# Patient Record
Sex: FEMALE | Race: WHITE | Employment: FULL TIME | URBAN - NONMETROPOLITAN AREA
[De-identification: names, ages, dates, MRNs, and addresses within clinical notes are randomized per-mention and may not be internally consistent; named-entity substitution may affect disease eponyms.]

---

## 2018-09-24 ENCOUNTER — HOSPITAL ENCOUNTER (EMERGENCY)
Age: 23
Discharge: HOME | End: 2018-09-24

## 2018-09-24 VITALS
WEIGHT: 126 LBS | HEART RATE: 76 BPM | DIASTOLIC BLOOD PRESSURE: 64 MMHG | RESPIRATION RATE: 16 BRPM | HEIGHT: 63 IN | SYSTOLIC BLOOD PRESSURE: 110 MMHG | TEMPERATURE: 97.9 F | OXYGEN SATURATION: 98 % | BODY MASS INDEX: 22.32 KG/M2

## 2018-09-24 DIAGNOSIS — R11.0 NAUSEA: ICD-10-CM

## 2018-09-24 DIAGNOSIS — R10.9 ABDOMINAL PAIN, UNSPECIFIED ABDOMINAL LOCATION: ICD-10-CM

## 2018-09-24 DIAGNOSIS — J02.9 ACUTE PHARYNGITIS, UNSPECIFIED ETIOLOGY: Primary | ICD-10-CM

## 2018-09-24 DIAGNOSIS — R19.7 DIARRHEA, UNSPECIFIED TYPE: ICD-10-CM

## 2018-09-24 LAB
BACTERIA: ABNORMAL /HPF
BILIRUBIN URINE: ABNORMAL MG/DL
BLOOD, URINE: NEGATIVE
CAST TYPE: ABNORMAL /HPF
CLARITY: CLEAR
COLOR: YELLOW
CRYSTAL TYPE: ABNORMAL /HPF
EPITHELIAL CELLS, UA: ABNORMAL /HPF
GLUCOSE, URINE: NEGATIVE MG/DL
ICTOTEST: NEGATIVE
KETONES, URINE: ABNORMAL MG/DL
LEUKOCYTE ESTERASE, URINE: ABNORMAL
MUCUS: ABNORMAL HPF
NITRITE URINE, QUANTITATIVE: NEGATIVE
PH, URINE: 7 (ref 5–8)
PREGNANCY, URINE: NEGATIVE
PROTEIN UA: ABNORMAL MG/DL
RBC URINE: ABNORMAL /HPF (ref 0–6)
SPECIFIC GRAVITY UA: 1.01 (ref 1–1.03)
SPECIFIC GRAVITY, URINE: 1.01 (ref 1–1.03)
UROBILINOGEN, URINE: 1 MG/DL (ref 0.2–1)
VOLUME, (UVOL): 12 ML (ref 10–12)
WBC UA: ABNORMAL /HPF (ref 0–5)

## 2018-09-24 PROCEDURE — 99282 EMERGENCY DEPT VISIT SF MDM: CPT

## 2018-09-24 PROCEDURE — 6360000002 HC RX W HCPCS: Performed by: PHYSICIAN ASSISTANT

## 2018-09-24 PROCEDURE — 81025 URINE PREGNANCY TEST: CPT

## 2018-09-24 PROCEDURE — 6370000000 HC RX 637 (ALT 250 FOR IP): Performed by: PHYSICIAN ASSISTANT

## 2018-09-24 PROCEDURE — 81001 URINALYSIS AUTO W/SCOPE: CPT

## 2018-09-24 RX ORDER — ONDANSETRON 4 MG/1
4 TABLET, FILM COATED ORAL EVERY 8 HOURS PRN
Qty: 10 TABLET | Refills: 0 | Status: SHIPPED | OUTPATIENT
Start: 2018-09-24 | End: 2019-09-11

## 2018-09-24 RX ORDER — ONDANSETRON 4 MG/1
4 TABLET, ORALLY DISINTEGRATING ORAL ONCE
Status: COMPLETED | OUTPATIENT
Start: 2018-09-24 | End: 2018-09-24

## 2018-09-24 RX ORDER — DICYCLOMINE HYDROCHLORIDE 10 MG/1
10 CAPSULE ORAL ONCE
Status: COMPLETED | OUTPATIENT
Start: 2018-09-24 | End: 2018-09-24

## 2018-09-24 RX ORDER — ACETAMINOPHEN 500 MG
1000 TABLET ORAL ONCE
Status: COMPLETED | OUTPATIENT
Start: 2018-09-24 | End: 2018-09-24

## 2018-09-24 RX ORDER — DICYCLOMINE HYDROCHLORIDE 10 MG/1
20 CAPSULE ORAL EVERY 6 HOURS PRN
Qty: 20 CAPSULE | Refills: 0 | Status: SHIPPED | OUTPATIENT
Start: 2018-09-24 | End: 2019-09-11

## 2018-09-24 RX ADMIN — DICYCLOMINE HYDROCHLORIDE 10 MG: 10 CAPSULE ORAL at 16:16

## 2018-09-24 RX ADMIN — ONDANSETRON 4 MG: 4 TABLET, ORALLY DISINTEGRATING ORAL at 16:16

## 2018-09-24 RX ADMIN — ACETAMINOPHEN 1000 MG: 500 TABLET, FILM COATED ORAL at 16:15

## 2018-09-24 ASSESSMENT — PAIN DESCRIPTION - LOCATION: LOCATION: ABDOMEN

## 2018-09-24 ASSESSMENT — PAIN DESCRIPTION - PAIN TYPE: TYPE: ACUTE PAIN

## 2018-09-24 ASSESSMENT — PAIN DESCRIPTION - FREQUENCY: FREQUENCY: CONTINUOUS

## 2018-09-24 ASSESSMENT — PAIN SCALES - GENERAL
PAINLEVEL_OUTOF10: 10
PAINLEVEL_OUTOF10: 10

## 2018-09-24 NOTE — ED PROVIDER NOTES
eMERGENCY dEPARTMENT eNCOUnter        [address] [facility]    Chief Complaint   Patient presents with    Pharyngitis     x 2-3 days    Diarrhea     since this morning       HPI    Familia Jensen is a 23 y.o. female who presents with Sore throat, diarrhea. Onset was 3 days ago. Began with diarrhea, is not sure if there is any blood in it because she did not look. Denies fevers. States she gets some lower abdominal cramping more on the right before she has diarrhea then resolves, 10/10 while occurring. Reports nausea but no vomiting. States that her throat is been sore and she is been having a frontal headache. She has a sick contact with similar symptoms. She denies any changes in diet. Not aware of any current pregnancies. REVIEW OF SYSTEMS    See HPI for further details. Review of systems otherwise negative. Constitutional:  no fever. HENT:  + headache, no earache, + nasal congestion, no sinus pressure, + sore throat  Respiratory:  no cough, no sob. Cardiovascular:  Denies chest pain. GI:  Denies vomiting.  + nausea, diarrhea. Musculoskeletal:  Denies edema, tenderness. Integument:  Denies rashes. PAST MEDICAL HISTORY    Past Medical History:   Diagnosis Date    ADHD (attention deficit hyperactivity disorder)     Mild preeclampsia delivered 1/10/2017       SURGICAL HISTORY    History reviewed. No pertinent surgical history.     CURRENT MEDICATIONS    Current Outpatient Rx   Medication Sig Dispense Refill    ondansetron (ZOFRAN) 4 MG tablet Take 1 tablet by mouth every 8 hours as needed for Nausea 10 tablet 0    dicyclomine (BENTYL) 10 MG capsule Take 2 capsules by mouth every 6 hours as needed (cramps) 20 capsule 0    ibuprofen (ADVIL;MOTRIN) 600 MG tablet Take 1 tablet by mouth every 6 hours as needed for Pain 120 tablet 3    acetaminophen (TYLENOL) 500 MG tablet Take 1 tablet by mouth every 6 hours as needed for Pain 120 tablet 3       ALLERGIES    No Known Allergies    FAMILY HISTORY

## 2019-09-11 ENCOUNTER — APPOINTMENT (OUTPATIENT)
Dept: GENERAL RADIOLOGY | Age: 24
End: 2019-09-11

## 2019-09-11 ENCOUNTER — HOSPITAL ENCOUNTER (EMERGENCY)
Age: 24
Discharge: HOME | End: 2019-09-11
Attending: EMERGENCY MEDICINE

## 2019-09-11 VITALS
BODY MASS INDEX: 22.5 KG/M2 | RESPIRATION RATE: 16 BRPM | HEIGHT: 63 IN | SYSTOLIC BLOOD PRESSURE: 118 MMHG | DIASTOLIC BLOOD PRESSURE: 74 MMHG | TEMPERATURE: 98.3 F | OXYGEN SATURATION: 98 % | HEART RATE: 92 BPM | WEIGHT: 127 LBS

## 2019-09-11 DIAGNOSIS — S80.01XA CONTUSION OF RIGHT KNEE, INITIAL ENCOUNTER: Primary | ICD-10-CM

## 2019-09-11 PROCEDURE — 73560 X-RAY EXAM OF KNEE 1 OR 2: CPT

## 2019-09-11 PROCEDURE — 6370000000 HC RX 637 (ALT 250 FOR IP): Performed by: EMERGENCY MEDICINE

## 2019-09-11 PROCEDURE — 99283 EMERGENCY DEPT VISIT LOW MDM: CPT

## 2019-09-11 RX ORDER — IBUPROFEN 600 MG/1
600 TABLET ORAL ONCE
Status: COMPLETED | OUTPATIENT
Start: 2019-09-11 | End: 2019-09-11

## 2019-09-11 RX ADMIN — IBUPROFEN 600 MG: 600 TABLET, FILM COATED ORAL at 21:04

## 2019-09-11 ASSESSMENT — PAIN DESCRIPTION - DESCRIPTORS: DESCRIPTORS: NUMBNESS

## 2019-09-11 ASSESSMENT — PAIN DESCRIPTION - LOCATION: LOCATION: KNEE

## 2019-09-11 ASSESSMENT — PAIN SCALES - GENERAL: PAINLEVEL_OUTOF10: 4

## 2019-09-11 ASSESSMENT — PAIN DESCRIPTION - ORIENTATION: ORIENTATION: RIGHT

## 2019-09-11 ASSESSMENT — PAIN DESCRIPTION - PAIN TYPE: TYPE: ACUTE PAIN

## 2019-09-12 NOTE — ED PROVIDER NOTES
organization: Not on file     Attends meetings of clubs or organizations: Not on file     Relationship status: Not on file    Intimate partner violence:     Fear of current or ex partner: Not on file     Emotionally abused: Not on file     Physically abused: Not on file     Forced sexual activity: Not on file   Other Topics Concern    Not on file   Social History Narrative    Not on file     No current facility-administered medications for this encounter. No current outpatient medications on file. No Known Allergies    Nursing Notes Reviewed    Physical Exam:  ED Triage Vitals [09/11/19 2039]   Enc Vitals Group      /74      Pulse 92      Resp 16      Temp 98.3 °F (36.8 °C)      Temp Source Oral      SpO2 98 %      Weight 127 lb (57.6 kg)      Height 5' 3\" (1.6 m)      Head Circumference       Peak Flow       Pain Score       Pain Loc       Pain Edu? Excl. in [address]? GENERAL APPEARANCE: Awake and alert. Cooperative. No acute distress. EXTREMITIES: RLE: No deformity. Tenderness to palpation over lateral and medial aspects of knee. No ligamentous instability of the knee. Full range of motion at knee without limitation. Pain with knee flexion. SKIN: Warm and dry. I have reviewed and interpreted all of the currently available lab results from this visit (if applicable):  No results found for this visit on 09/11/19. Radiographs (if obtained):  [] The following radiograph was interpreted by myself in the absence of a radiologist:  [x] Radiologist's Report Reviewed:    EKG (if obtained): (All EKG's are interpreted by myself in the absence of a cardiologist)    MDM:  Plan of care is discussed thoroughly with the patient and family if present. If performed, all imaging and lab work also discussed with patient. All relevant prior results and chart reviewed if available. Patient presents with direct trauma to lateral knee.   She is neurovascularly intact without any significant

## 2019-09-12 NOTE — ED NOTES
Discharge instructions reviewed; patient verbalized understanding; patient transferred from ED via private vehicle.       Radha Marie RN  09/11/19 [id number]

## 2019-09-20 ENCOUNTER — HOSPITAL ENCOUNTER (EMERGENCY)
Age: 24
Discharge: HOME | End: 2019-09-20
Attending: EMERGENCY MEDICINE

## 2019-09-20 VITALS
BODY MASS INDEX: 22.5 KG/M2 | OXYGEN SATURATION: 99 % | HEART RATE: 75 BPM | SYSTOLIC BLOOD PRESSURE: 114 MMHG | DIASTOLIC BLOOD PRESSURE: 77 MMHG | RESPIRATION RATE: 14 BRPM | WEIGHT: 127 LBS | TEMPERATURE: 98.7 F | HEIGHT: 63 IN

## 2019-09-20 DIAGNOSIS — B34.9 VIRAL SYNDROME: Primary | ICD-10-CM

## 2019-09-20 PROCEDURE — 87430 STREP A AG IA: CPT

## 2019-09-20 PROCEDURE — 99283 EMERGENCY DEPT VISIT LOW MDM: CPT

## 2019-09-20 PROCEDURE — 87081 CULTURE SCREEN ONLY: CPT

## 2019-09-20 RX ORDER — GUAIFENESIN AND DEXTROMETHORPHAN HYDROBROMIDE 600; 30 MG/1; MG/1
1 TABLET, EXTENDED RELEASE ORAL 2 TIMES DAILY
Qty: 28 TABLET | Refills: 0 | Status: SHIPPED | OUTPATIENT
Start: 2019-09-20 | End: 2020-02-07

## 2019-09-20 RX ORDER — ONDANSETRON 4 MG/1
4 TABLET, FILM COATED ORAL 3 TIMES DAILY PRN
Qty: 30 TABLET | Refills: 0 | Status: SHIPPED | OUTPATIENT
Start: 2019-09-20 | End: 2020-02-07

## 2019-09-20 RX ORDER — NAPROXEN 500 MG/1
500 TABLET ORAL 2 TIMES DAILY
Qty: 60 TABLET | Refills: 0 | Status: SHIPPED | OUTPATIENT
Start: 2019-09-20 | End: 2020-02-07

## 2019-09-20 RX ORDER — ACETAMINOPHEN 325 MG/1
650 TABLET ORAL EVERY 6 HOURS PRN
Qty: 120 TABLET | Refills: 3 | Status: SHIPPED | OUTPATIENT
Start: 2019-09-20 | End: 2020-02-07

## 2019-09-20 ASSESSMENT — PAIN DESCRIPTION - LOCATION
LOCATION_2: HEAD
LOCATION: THROAT

## 2019-09-20 ASSESSMENT — PAIN DESCRIPTION - PAIN TYPE: TYPE: ACUTE PAIN

## 2019-09-20 ASSESSMENT — PAIN SCALES - GENERAL: PAINLEVEL_OUTOF10: 3

## 2019-09-20 ASSESSMENT — PAIN DESCRIPTION - FREQUENCY: FREQUENCY: CONTINUOUS

## 2019-09-20 ASSESSMENT — PAIN DESCRIPTION - DESCRIPTORS
DESCRIPTORS_2: STABBING
DESCRIPTORS: SHARP

## 2019-09-20 ASSESSMENT — PAIN DESCRIPTION - INTENSITY: RATING_2: 4

## 2019-09-20 ASSESSMENT — PAIN DESCRIPTION - DURATION: DURATION_2: CONTINUOUS

## 2019-09-20 NOTE — LETTER
[facility] Emergency Department  [address] [place] [id number]  Phone: [phone]  Fax: [phone]             September 20, 2019    Patient: Raúl Elise   YOB: 1995   Date of Visit: 9/20/2019       To Whom It May Concern:    Melany Walter was seen and treated in our emergency department on 9/20/2019. She may return to work on 09/21/2019.       Sincerely,             Signature:__________________________________

## 2019-09-20 NOTE — ED PROVIDER NOTES
Triage Chief Complaint:   Pharyngitis (States has been having hot flashes and cold sweats. States also coughing up greenish colored mucous. Also c/o sore throat. States has been having a little trouble breathing and headaches. States has been feeling nauseated. )    [place]:  Markos Steele is a 26 y.o. female that presents with sore throat, cough, fevers and chills, headache, nausea and vomiting and body aches. Patient was in baseline state of health until several days ago when the above started. Patient reports persistent productive cough and sore throat. Patient reports \"I am a carrier for strep and I think I might have strep throat\". Some associated nausea and vomiting that is nonbloody. Generalized malaise and body aches. No neck stiffness. No rash. No known sick contacts. Patient reports that she does get sick like this a lot. Patient is a smoker. No known COPD or asthma. ROS:  General:  + fevers, + chills  ENT: + sore throat, no runny nose  Cardiovascular:  No chest pain, no palpitations  Respiratory:  + shortness of breath with coughing fits, + cough  Gastrointestinal:  No pain, + nausea, + vomiting, no diarrhea  : No pain with urinating, no increased frequency urinating  Neurologic:  No numbness, no weakness  Extremities:  No edema, no pain  Skin:  No rash  Psych: No axienty    Past Medical History:   Diagnosis Date    ADHD (attention deficit hyperactivity disorder)     Mild preeclampsia delivered 1/10/2017     History reviewed. No pertinent surgical history. History reviewed. No pertinent family history.   Social History     Socioeconomic History    Marital status: Single     Spouse name: Not on file    Number of children: Not on file    Years of education: Not on file    Highest education level: Not on file   Occupational History    Not on file   Social Needs    Financial resource strain: Not on file    Food insecurity:     Worry: Not on file     Inability: Not on file   [facility] Transportation needs:     Medical: Not on file     Non-medical: Not on file   Tobacco Use    Smoking status: Current Every Day Smoker     Packs/day: 0.50     Types: Cigarettes    Smokeless tobacco: Former User   Substance and Sexual Activity    Alcohol use: No    Drug use: Yes     Types: Marijuana    Sexual activity: Not on file   Lifestyle    Physical activity:     Days per week: Not on file     Minutes per session: Not on file    Stress: Not on file   Relationships    Social connections:     Talks on phone: Not on file     Gets together: Not on file     Attends [employer] service: Not on file     Active member of club or organization: Not on file     Attends meetings of clubs or organizations: Not on file     Relationship status: Not on file    Intimate partner violence:     Fear of current or ex partner: Not on file     Emotionally abused: Not on file     Physically abused: Not on file     Forced sexual activity: Not on file   Other Topics Concern    Not on file   Social History Narrative    Not on file     No current facility-administered medications for this encounter.       Current Outpatient Medications   Medication Sig Dispense Refill    Dextromethorphan-guaiFENesin (MUCINEX DM)  MG TB12 Take 1 tablet by mouth 2 times daily 28 tablet 0    acetaminophen (AMINOFEN) 325 MG tablet Take 2 tablets by mouth every 6 hours as needed for Pain 120 tablet 3    naproxen (NAPROSYN) 500 MG tablet Take 1 tablet by mouth 2 times daily 60 tablet 0    ondansetron (ZOFRAN) 4 MG tablet Take 1 tablet by mouth 3 times daily as needed for Nausea or Vomiting 30 tablet 0     No Known Allergies    Nursing Notes Reviewed    Physical Exam:  ED Triage Vitals [09/20/19 1656]   Enc Vitals Group      /77      Pulse 75      Resp 14      Temp 98.7 °F (37.1 °C)      Temp Source Oral      SpO2 99 %      Weight 127 lb (57.6 kg)      Height 5' 3\" (1.6 m)      Head Circumference       Peak Flow       Pain Score       Pain well as words and phrases that may be inappropriate. If there are any questions or concerns please feel free to contact the dictating provider for clarification.          Larry Zhang MD  09/20/19 2448

## 2019-09-23 LAB
CULTURE: ABNORMAL
Lab: ABNORMAL
SPECIMEN: ABNORMAL
STREP A DIRECT SCREEN: NEGATIVE

## 2020-02-07 ENCOUNTER — HOSPITAL ENCOUNTER (EMERGENCY)
Age: 25
Discharge: HOME | End: 2020-02-07
Attending: EMERGENCY MEDICINE

## 2020-02-07 VITALS
SYSTOLIC BLOOD PRESSURE: 113 MMHG | OXYGEN SATURATION: 98 % | WEIGHT: 125 LBS | HEART RATE: 67 BPM | BODY MASS INDEX: 22.15 KG/M2 | RESPIRATION RATE: 16 BRPM | DIASTOLIC BLOOD PRESSURE: 73 MMHG | TEMPERATURE: 97.7 F | HEIGHT: 63 IN

## 2020-02-07 PROCEDURE — 99282 EMERGENCY DEPT VISIT SF MDM: CPT

## 2020-02-07 PROCEDURE — 6370000000 HC RX 637 (ALT 250 FOR IP): Performed by: EMERGENCY MEDICINE

## 2020-02-07 RX ORDER — DIPHENHYDRAMINE HCL 25 MG
50 CAPSULE ORAL ONCE
Status: COMPLETED | OUTPATIENT
Start: 2020-02-07 | End: 2020-02-07

## 2020-02-07 RX ORDER — CETIRIZINE HYDROCHLORIDE 10 MG/1
10 TABLET ORAL DAILY
Qty: 20 TABLET | Refills: 0 | Status: SHIPPED | OUTPATIENT
Start: 2020-02-07 | End: 2021-07-22

## 2020-02-07 RX ORDER — AMOXICILLIN 500 MG/1
500 CAPSULE ORAL ONCE
Status: COMPLETED | OUTPATIENT
Start: 2020-02-07 | End: 2020-02-07

## 2020-02-07 RX ORDER — AMOXICILLIN 500 MG/1
500 CAPSULE ORAL 2 TIMES DAILY
Qty: 20 CAPSULE | Refills: 0 | Status: SHIPPED | OUTPATIENT
Start: 2020-02-07 | End: 2020-02-17

## 2020-02-07 RX ADMIN — AMOXICILLIN 500 MG: 500 CAPSULE ORAL at 20:22

## 2020-02-07 RX ADMIN — DIPHENHYDRAMINE HYDROCHLORIDE 50 MG: 25 CAPSULE ORAL at 20:24

## 2020-02-07 ASSESSMENT — PAIN DESCRIPTION - DESCRIPTORS: DESCRIPTORS: ACHING

## 2020-02-07 ASSESSMENT — PAIN - FUNCTIONAL ASSESSMENT: PAIN_FUNCTIONAL_ASSESSMENT: PREVENTS OR INTERFERES SOME ACTIVE ACTIVITIES AND ADLS

## 2020-02-07 ASSESSMENT — PAIN DESCRIPTION - ONSET: ONSET: PROGRESSIVE

## 2020-02-07 ASSESSMENT — PAIN DESCRIPTION - PAIN TYPE: TYPE: ACUTE PAIN

## 2020-02-07 ASSESSMENT — ENCOUNTER SYMPTOMS
GASTROINTESTINAL NEGATIVE: 1
RHINORRHEA: 1
SORE THROAT: 1
EYES NEGATIVE: 1
COUGH: 1

## 2020-02-07 ASSESSMENT — PAIN SCALES - GENERAL: PAINLEVEL_OUTOF10: 6

## 2020-02-07 ASSESSMENT — PAIN DESCRIPTION - FREQUENCY: FREQUENCY: CONTINUOUS

## 2020-02-07 ASSESSMENT — PAIN DESCRIPTION - PROGRESSION: CLINICAL_PROGRESSION: GRADUALLY WORSENING

## 2020-02-07 ASSESSMENT — PAIN DESCRIPTION - LOCATION: LOCATION: GENERALIZED

## 2020-02-07 NOTE — LETTER
[facility] Emergency Department  [address] [place] [id number]  Phone: [phone]  Fax: [phone]               February 7, 2020    Patient: Ritu Hinojosa   YOB: 1995   Date of Visit: 2/7/2020       To Whom It May Concern:    Sarabjit Laureano was seen and treated in our emergency department on 2/7/2020. She will need to be off work till Monday 2/10/2020.     Sincerely,       Moy Reyes RN         Signature:__________________________________

## 2020-02-08 NOTE — ED PROVIDER NOTES
The history is provided by the patient. Ear Problem   Location:  Right  Behind ear:  No abnormality  Quality:  Aching, dull and pressure  Severity:  Severe  Timing:  Constant  Progression:  Worsening  Chronicity:  New  Context: recent URI    Relieved by:  Nothing  Worsened by:  Nothing  Ineffective treatments:  None tried  Associated symptoms: congestion, cough, fever, rhinorrhea and sore throat        Review of Systems   Constitutional: Positive for fever. HENT: Positive for congestion, rhinorrhea and sore throat. Eyes: Negative. Respiratory: Positive for cough. Cardiovascular: Negative. Gastrointestinal: Negative. Genitourinary: Negative. Musculoskeletal: Negative. Skin: Negative. Neurological: Negative. All other systems reviewed and are negative. History reviewed. No pertinent family history.   Social History     Socioeconomic History    Marital status: Single     Spouse name: Not on file    Number of children: Not on file    Years of education: Not on file    Highest education level: Not on file   Occupational History    Not on file   Social Needs    Financial resource strain: Not on file    Food insecurity:     Worry: Not on file     Inability: Not on file    Transportation needs:     Medical: Not on file     Non-medical: Not on file   Tobacco Use    Smoking status: Current Every Day Smoker     Packs/day: 0.50     Years: 4.00     Pack years: 2.00     Types: Cigarettes    Smokeless tobacco: Former User   Substance and Sexual Activity    Alcohol use: No    Drug use: Yes     Frequency: 2.0 times per week     Types: Marijuana    Sexual activity: Yes     Partners: Female   Lifestyle    Physical activity:     Days per week: Not on file     Minutes per session: Not on file    Stress: Not on file   Relationships    Social connections:     Talks on phone: Not on file     Gets together: Not on file     Attends Christianity service: Not on file     Active member of club [employer] absent. Cardiovascular:      Rate and Rhythm: Normal rate. Pulmonary:      Effort: Pulmonary effort is normal. No respiratory distress. Breath sounds: Normal breath sounds. Abdominal:      General: Bowel sounds are normal. There is no distension. Palpations: Abdomen is soft. Tenderness: There is no abdominal tenderness. Musculoskeletal: Normal range of motion. General: No tenderness. Skin:     General: Skin is warm and dry. Neurological:      Mental Status: She is alert and oriented to person, place, and time. Deep Tendon Reflexes: Reflexes are normal and symmetric. Psychiatric:         Thought Content: Thought content normal.         MDM:    No results found for this visit on 02/07/20. My typical dicussion, presentation,and considerations for this patients' chief complaint, diagnosis, differential diagnosis, medications, medication use,  medication safety and medication interactions have been explained and outlined to this patient for thispatient encounter. I have stressed need for follow up and reexamination for this encounter and or return to the emergency department if any changes or any concern. Final Impression    1.  Non-recurrent acute serous otitis media of right ear             287 Makenna Langley DO  02/07/20 5669

## 2021-03-10 ENCOUNTER — HOSPITAL ENCOUNTER (EMERGENCY)
Age: 26
Discharge: HOME | End: 2021-03-11
Attending: EMERGENCY MEDICINE
Payer: COMMERCIAL

## 2021-03-10 VITALS
WEIGHT: 119 LBS | HEIGHT: 63 IN | HEART RATE: 76 BPM | DIASTOLIC BLOOD PRESSURE: 73 MMHG | SYSTOLIC BLOOD PRESSURE: 124 MMHG | BODY MASS INDEX: 21.09 KG/M2 | RESPIRATION RATE: 16 BRPM | TEMPERATURE: 98.2 F

## 2021-03-10 DIAGNOSIS — K02.9 PAIN DUE TO DENTAL CARIES: ICD-10-CM

## 2021-03-10 DIAGNOSIS — K08.89 PAIN, DENTAL: Primary | ICD-10-CM

## 2021-03-10 PROCEDURE — 6370000000 HC RX 637 (ALT 250 FOR IP): Performed by: EMERGENCY MEDICINE

## 2021-03-10 PROCEDURE — 99283 EMERGENCY DEPT VISIT LOW MDM: CPT

## 2021-03-10 PROCEDURE — 6360000002 HC RX W HCPCS: Performed by: EMERGENCY MEDICINE

## 2021-03-10 PROCEDURE — 96372 THER/PROPH/DIAG INJ SC/IM: CPT

## 2021-03-10 RX ORDER — LIDOCAINE HYDROCHLORIDE 20 MG/ML
15 SOLUTION OROPHARYNGEAL ONCE
Status: COMPLETED | OUTPATIENT
Start: 2021-03-11 | End: 2021-03-10

## 2021-03-10 RX ORDER — KETOROLAC TROMETHAMINE 30 MG/ML
30 INJECTION, SOLUTION INTRAMUSCULAR; INTRAVENOUS ONCE
Status: COMPLETED | OUTPATIENT
Start: 2021-03-11 | End: 2021-03-10

## 2021-03-10 RX ORDER — CLINDAMYCIN HYDROCHLORIDE 150 MG/1
300 CAPSULE ORAL ONCE
Status: COMPLETED | OUTPATIENT
Start: 2021-03-11 | End: 2021-03-10

## 2021-03-10 RX ADMIN — CLINDAMYCIN HYDROCHLORIDE 300 MG: 150 CAPSULE ORAL at 23:54

## 2021-03-10 RX ADMIN — LIDOCAINE HYDROCHLORIDE 15 ML: 20 SOLUTION ORAL; TOPICAL at 23:54

## 2021-03-10 RX ADMIN — KETOROLAC TROMETHAMINE 30 MG: 30 INJECTION, SOLUTION INTRAMUSCULAR; INTRAVENOUS at 23:54

## 2021-03-10 ASSESSMENT — PAIN SCALES - GENERAL
PAINLEVEL_OUTOF10: 7
PAINLEVEL_OUTOF10: 6

## 2021-03-10 ASSESSMENT — PAIN DESCRIPTION - LOCATION: LOCATION: MOUTH

## 2021-03-10 ASSESSMENT — PAIN DESCRIPTION - DESCRIPTORS: DESCRIPTORS: ACHING

## 2021-03-11 LAB
AMPHETAMINES: ABNORMAL
BARBITURATE SCREEN URINE: NEGATIVE
BENZODIAZEPINE SCREEN, URINE: NEGATIVE
CANNABINOID SCREEN URINE: ABNORMAL
COCAINE METABOLITE: NEGATIVE
OPIATES, URINE: NEGATIVE
OXYCODONE: NEGATIVE
PHENCYCLIDINE, URINE: NEGATIVE

## 2021-03-11 PROCEDURE — 80307 DRUG TEST PRSMV CHEM ANLYZR: CPT

## 2021-03-11 RX ORDER — LIDOCAINE HYDROCHLORIDE 20 MG/ML
15 SOLUTION OROPHARYNGEAL PRN
Qty: 1 BOTTLE | Refills: 1 | Status: SHIPPED | OUTPATIENT
Start: 2021-03-11 | End: 2021-07-22

## 2021-03-11 RX ORDER — LIDOCAINE HYDROCHLORIDE 20 MG/ML
20 SOLUTION OROPHARYNGEAL EVERY 4 HOURS PRN
Qty: 2 BOTTLE | Refills: 0 | Status: SHIPPED | OUTPATIENT
Start: 2021-03-11 | End: 2021-03-16

## 2021-03-11 RX ORDER — CHLORHEXIDINE GLUCONATE 0.12 MG/ML
15 RINSE ORAL 3 TIMES DAILY
Qty: 1 BOTTLE | Refills: 0 | Status: SHIPPED | OUTPATIENT
Start: 2021-03-11 | End: 2021-03-25

## 2021-03-11 RX ORDER — NAPROXEN 500 MG/1
500 TABLET ORAL 2 TIMES DAILY PRN
Qty: 20 TABLET | Refills: 0 | Status: SHIPPED | OUTPATIENT
Start: 2021-03-11 | End: 2021-07-22

## 2021-03-11 ASSESSMENT — ENCOUNTER SYMPTOMS
VOMITING: 0
EYES NEGATIVE: 1
FACIAL SWELLING: 0
GASTROINTESTINAL NEGATIVE: 1
SHORTNESS OF BREATH: 0
COUGH: 0
RESPIRATORY NEGATIVE: 1
CHEST TIGHTNESS: 0
EYE PAIN: 0
ABDOMINAL PAIN: 0
SINUS PAIN: 0
NAUSEA: 0
SORE THROAT: 0
SINUS PRESSURE: 0

## 2021-03-12 NOTE — ED PROVIDER NOTES
[address]      Pt Name: Joaquina Garcia  MRN: [id number]  [place] 1995  Date of evaluation: 3/10/2021  Provider: Silvia March DO    CHIEF COMPLAINT       Chief Complaint   Patient presents with    Dental Pain     complains of severe lwft sided mouth pain for 2 days         HISTORY OF PRESENT ILLNESS      Joaquina Garcia is a 23 y.o. female who presents to the emergency department  for   Chief Complaint   Patient presents with    Dental Pain     complains of severe lwft sided mouth pain for 2 days       72-year-old female presents emergency department with chief complaint of left-sided dental pain that has worsened over the last 2 days. Patient has very poor dentition and has not seen a dentist.  Patient denies neck pain. Patient denies other associated symptoms. Patient reports the pain is worsened by cold or hot food or fluids. The history is provided by the patient and medical records. No  was used. Nursing Notes, Triage Notes & Vital Signs were reviewed. REVIEW OF SYSTEMS    (2-9 systems for level 4, 10 or more for level 5)     Review of Systems   Constitutional: Negative. Negative for chills, fatigue and fever. HENT: Positive for dental problem. Negative for congestion, facial swelling, mouth sores, sinus pressure, sinus pain and sore throat. Eyes: Negative. Negative for pain and visual disturbance. Respiratory: Negative. Negative for cough, chest tightness and shortness of breath. Cardiovascular: Negative. Negative for chest pain and palpitations. Gastrointestinal: Negative. Negative for abdominal pain, nausea and vomiting. Genitourinary: Negative. Negative for dysuria, flank pain, frequency, hematuria and urgency. Musculoskeletal: Negative. Negative for arthralgias and myalgias. Skin: Negative. Negative for rash. Neurological: Negative.   Negative for dizziness, speech difficulty, light-headedness, numbness and headaches. Psychiatric/Behavioral: Negative. Negative for agitation and confusion. The patient is not nervous/anxious. All other systems reviewed and are negative. Except as noted above the remainder of the review of systems was reviewed and negative. PAST MEDICAL HISTORY     Past Medical History:   Diagnosis Date    ADHD (attention deficit hyperactivity disorder)     Mild preeclampsia delivered 1/10/2017       Prior to Admission medications    Medication Sig Start Date End Date Taking? Authorizing Provider   lidocaine viscous hcl (XYLOCAINE) 2 % SOLN solution Take 20 mLs by mouth every 4 hours as needed for Dental Pain or Pain Take 15 mLs by mouth every 4 hours as needed for Irritation or Pain. You can swish and swallow or gargle 3/11/21 3/16/21 Yes Kaden Farm, DO   naproxen (NAPROSYN) 500 MG tablet Take 1 tablet by mouth 2 times daily as needed for Pain 3/11/21 3/21/21 Yes Kaden Farm, DO   chlorhexidine (PERIDEX) 0.12 % solution Take 15 mLs by mouth 3 times daily for 14 days 3/11/21 3/25/21 Yes Kaden Farm, DO   lidocaine viscous hcl (XYLOCAINE) 2 % SOLN solution Take 15 mLs by mouth as needed for Irritation 3/11/21  Yes Kaden Farm, DO   cetirizine (ZYRTEC ALLERGY) 10 MG tablet Take 1 tablet by mouth daily 2/7/20   Webb Carbon, DO        Patient Active Problem List   Diagnosis    Mild preeclampsia delivered         SURGICAL HISTORY     History reviewed. No pertinent surgical history. CURRENT MEDICATIONS       Discharge Medication List as of 3/11/2021  1:05 AM      CONTINUE these medications which have NOT CHANGED    Details   cetirizine (ZYRTEC ALLERGY) 10 MG tablet Take 1 tablet by mouth daily, Disp-20 tablet, R-0Print             ALLERGIES     Patient has no known allergies. FAMILY HISTORY     No family history on file.        SOCIAL HISTORY       Social History     Socioeconomic History    Marital status: Single Spouse name: None    Number of children: None    Years of education: None    Highest education level: None   Occupational History    None   Social Needs    Financial resource strain: None    Food insecurity     Worry: None     Inability: None    Transportation needs     Medical: None     Non-medical: None   Tobacco Use    Smoking status: Current Every Day Smoker     Packs/day: 0.50     Years: 4.00     Pack years: 2.00     Types: Cigarettes    Smokeless tobacco: Former User   Substance and Sexual Activity    Alcohol use: No    Drug use: Yes     Frequency: 2.0 times per week     Types: Marijuana    Sexual activity: Yes     Partners: Female   Lifestyle    Physical activity     Days per week: None     Minutes per session: None    Stress: None   Relationships    Social connections     Talks on phone: None     Gets together: None     Attends Hoahaoism service: None     Active member of club or organization: None     Attends meetings of clubs or organizations: None     Relationship status: None    Intimate partner violence     Fear of current or ex partner: None     Emotionally abused: None     Physically abused: None     Forced sexual activity: None   Other Topics Concern    None   Social History Narrative    None       SCREENINGS               PHYSICAL EXAM    (up to 7 for level 4, 8 or more for level 5)     ED Triage Vitals [03/10/21 2331]   BP Temp Temp Source Pulse Resp SpO2 Height Weight   124/73 98.2 °F (36.8 °C) Oral 76 16 -- 5' 3\" (1.6 m) 119 lb (54 kg)       Physical Exam  Vitals signs and nursing note reviewed. Constitutional:       General: She is not in acute distress. Appearance: She is well-developed. She is not diaphoretic. HENT:      Head: Normocephalic and atraumatic. Right Ear: External ear normal.      Left Ear: External ear normal.      Nose: Nose normal.      Mouth/Throat:      Mouth: Mucous membranes are moist.      Dentition: Abnormal dentition. Does not have dentures. Dental tenderness, gingival swelling and dental caries present. No dental abscesses. Pharynx: No oropharyngeal exudate. Eyes:      General:         Right eye: No discharge. Left eye: No discharge. Pupils: Pupils are equal, round, and reactive to light. Neck:      Musculoskeletal: Normal range of motion. No muscular tenderness. Thyroid: No thyromegaly. Vascular: No JVD. Trachea: No tracheal deviation. Cardiovascular:      Rate and Rhythm: Normal rate and regular rhythm. Pulmonary:      Effort: Pulmonary effort is normal. No respiratory distress. Breath sounds: No stridor. Abdominal:      Palpations: Abdomen is soft. Musculoskeletal: Normal range of motion. General: No tenderness or deformity. Lymphadenopathy:      Cervical: No cervical adenopathy. Skin:     General: Skin is warm. Capillary Refill: Capillary refill takes less than 2 seconds. Coloration: Skin is not pale. Findings: No erythema or rash. Neurological:      Mental Status: She is alert and oriented to person, place, and time. Cranial Nerves: No cranial nerve deficit. Motor: No abnormal muscle tone. Coordination: Coordination normal.   Psychiatric:         Mood and Affect: Mood normal.         Behavior: Behavior normal.         Thought Content:  Thought content normal.         Judgment: Judgment normal.         DIAGNOSTIC RESULTS     Labs Reviewed   URINE DRUG SCREEN - Abnormal; Notable for the following components:       Result Value    Cannabinoid Scrn, Ur UNCONFIRMED POSITIVE (*)     Amphetamines UNCONFIRMED POSITIVE (*)     All other components within normal limits          EKG: All EKG's are interpreted by the Emergency Department Physician who either signs or Co-signs this chart in the absence of a cardiologist.       EKG Interpretation    Interpreted by emergency department physician    Jenifer Neilvard:     Non-plain film images such as CT, Ultrasound and MRI are read by the radiologist. Plain radiographic images are visualized and preliminarily interpreted by the emergency physician. Interpretation per the Radiologist below, if available at the time of this note:    No orders to display         ED BEDSIDE ULTRASOUND:   Performed by ED Physician Kaden Doherty DO       LABS:  Labs Reviewed   URINE DRUG SCREEN - Abnormal; Notable for the following components:       Result Value    Cannabinoid Scrn, Ur UNCONFIRMED POSITIVE (*)     Amphetamines UNCONFIRMED POSITIVE (*)     All other components within normal limits       All other labs were within normal range or not returned as of this dictation. EMERGENCY DEPARTMENT COURSE and DIFFERENTIAL DIAGNOSIS/MDM:   Vitals:    Vitals:    03/10/21 2331   BP: 124/73   Pulse: 76   Resp: 16   Temp: 98.2 °F (36.8 °C)   TempSrc: Oral   Weight: 119 lb (54 kg)   Height: 5' 3\" (1.6 m)           MDM  Number of Diagnoses or Management Options  Pain due to dental caries  Pain, dental  Diagnosis management comments: 27-year-old female presents emergency department with chief complaint of dental pain that is located to the left mandibular molars. Patient has very poor dentition with erosions. Patient was given viscous lidocaine, Toradol and clindamycin. Patient was given resources regarding dental providers in the area. There is no apical abscess noted on examination. Patient has no peritonsillar abscess. Patient denies difficulty swallowing. Return precautions given for airway involvement. -  Patient seen and evaluated in the emergency department. -  Triage and nursing notes reviewed and incorporated. -  Old chart records reviewed and incorporated. -  Work-up included:  See above  -  Results discussed with patient. REASSESSMENT          CRITICAL CARE TIME     This excludes seperately billable procedures and family discussion time.  Critical care time provided for obtaining history, conducting a physical exam, performing and monitoring interventions, ordering, collecting and interpreting tests, and establishing medical decision-making. There was a potential for life/limb threatening pathology requiring close evaluation and intervention with concern for patient decompensation. CONSULTS:  None    PROCEDURES:  None performed unless otherwise noted below     Procedures        FINAL IMPRESSION      1. Pain, dental    2. Pain due to dental caries          DISPOSITION/PLAN   DISPOSITION Decision To Discharge 03/11/2021 12:00:18 AM      PATIENT REFERRED TO:  No follow-up provider specified. DISCHARGE MEDICATIONS:  Discharge Medication List as of 3/11/2021  1:05 AM      START taking these medications    Details   !! lidocaine viscous hcl (XYLOCAINE) 2 % SOLN solution Take 20 mLs by mouth every 4 hours as needed for Dental Pain or Pain Take 15 mLs by mouth every 4 hours as needed for Irritation or Pain. You can swish and swallow or gargle, Disp-2 Bottle, R-0Print      naproxen (NAPROSYN) 500 MG tablet Take 1 tablet by mouth 2 times daily as needed for Pain, Disp-20 tablet, R-0Print      chlorhexidine (PERIDEX) 0.12 % solution Take 15 mLs by mouth 3 times daily for 14 days, Disp-1 Bottle, R-0Print      !! lidocaine viscous hcl (XYLOCAINE) 2 % SOLN solution Take 15 mLs by mouth as needed for Irritation, Disp-1 Bottle, R-1Print       !! - Potential duplicate medications found. Please discuss with provider. ED Provider Disposition Time  DISPOSITION Decision To Discharge 03/11/2021 12:00:18 AM      Appropriate personal protective equipment was worn during the patient's evaluation. These included surgical, eye protection, surgical mask or in 95 respirator and gloves. The patient was also placed in a surgical mask for the prevention of possible spread of respiratory viral illnesses. The Patient was instructed to read the package inserts with any medication that was prescribed.   Major potential reactions and medication interactions were discussed. The Patient understands that there are numerous possible adverse reactions not covered. The patient was also instructed to arrange follow-up with his or her primary care provider for review of any pending labwork or incidental findings on any radiology results that were obtained. All efforts were made to discuss any incidental findings that require further monitoring. Controlled Substances Monitoring:     No flowsheet data found.     (Please note that portions of this note were completed with a voice recognition program.  Efforts were made to edit the dictations but occasionally words are mis-transcribed.)    Carisa Chavez DO (electronically signed)  Attending Emergency Physician            Carisa Chavez DO  03/11/21 4110

## 2021-07-22 ENCOUNTER — HOSPITAL ENCOUNTER (EMERGENCY)
Age: 26
Discharge: HOME | End: 2021-07-22
Attending: EMERGENCY MEDICINE
Payer: COMMERCIAL

## 2021-07-22 VITALS
RESPIRATION RATE: 16 BRPM | BODY MASS INDEX: 21.16 KG/M2 | DIASTOLIC BLOOD PRESSURE: 68 MMHG | SYSTOLIC BLOOD PRESSURE: 110 MMHG | TEMPERATURE: 98.2 F | OXYGEN SATURATION: 97 % | HEIGHT: 62 IN | WEIGHT: 115 LBS | HEART RATE: 63 BPM

## 2021-07-22 DIAGNOSIS — L03.116 CELLULITIS OF LEFT LOWER EXTREMITY: Primary | ICD-10-CM

## 2021-07-22 PROCEDURE — 6360000002 HC RX W HCPCS: Performed by: EMERGENCY MEDICINE

## 2021-07-22 PROCEDURE — 99284 EMERGENCY DEPT VISIT MOD MDM: CPT

## 2021-07-22 PROCEDURE — 6370000000 HC RX 637 (ALT 250 FOR IP): Performed by: EMERGENCY MEDICINE

## 2021-07-22 PROCEDURE — 90471 IMMUNIZATION ADMIN: CPT | Performed by: EMERGENCY MEDICINE

## 2021-07-22 PROCEDURE — 90715 TDAP VACCINE 7 YRS/> IM: CPT | Performed by: EMERGENCY MEDICINE

## 2021-07-22 RX ORDER — DOXYCYCLINE HYCLATE 100 MG
100 TABLET ORAL ONCE
Status: COMPLETED | OUTPATIENT
Start: 2021-07-22 | End: 2021-07-22

## 2021-07-22 RX ORDER — GINSENG 100 MG
CAPSULE ORAL ONCE
Status: COMPLETED | OUTPATIENT
Start: 2021-07-22 | End: 2021-07-22

## 2021-07-22 RX ORDER — DOXYCYCLINE HYCLATE 100 MG
100 TABLET ORAL 2 TIMES DAILY
Qty: 14 TABLET | Refills: 0 | Status: SHIPPED | OUTPATIENT
Start: 2021-07-22 | End: 2021-07-29

## 2021-07-22 RX ADMIN — TETANUS TOXOID, REDUCED DIPHTHERIA TOXOID AND ACELLULAR PERTUSSIS VACCINE, ADSORBED 0.5 ML: 5; 2.5; 8; 8; 2.5 SUSPENSION INTRAMUSCULAR at 15:35

## 2021-07-22 RX ADMIN — BACITRACIN: 500 OINTMENT TOPICAL at 15:42

## 2021-07-22 RX ADMIN — DOXYCYCLINE HYCLATE 100 MG: 100 TABLET, COATED ORAL at 15:37

## 2021-07-22 ASSESSMENT — PAIN DESCRIPTION - LOCATION: LOCATION: LEG

## 2021-07-22 ASSESSMENT — PAIN DESCRIPTION - DESCRIPTORS: DESCRIPTORS: THROBBING

## 2021-07-22 ASSESSMENT — PAIN DESCRIPTION - ORIENTATION: ORIENTATION: UPPER;LEFT

## 2021-07-22 ASSESSMENT — PAIN SCALES - GENERAL
PAINLEVEL_OUTOF10: 4
PAINLEVEL_OUTOF10: 6

## 2021-07-22 NOTE — ED PROVIDER NOTES
Triage Chief Complaint:   Wound Check (Patient gave herself a tatoo on the left thigh 2-3 weeks ago, since then outside of tatoo has been red and scabbing. )    ANJELICA Oakes is a 23 y.o. female that presents with concern regarding get to infection. Patient reports that she was self performing a tattoo to her left thigh approximately 2 to 3 weeks ago. Patient developed increasing redness and swelling and pain to the site of her tattoo. Patient reports that she has noticed intermittent clear yellow drainage from her tattoo after showers. Patient ports that the other tattoos that she performed at the time have since healed. Patient is concerned that there was a problem with contamination to the yellow dye she was using. No fevers. Patient is unsure of her tetanus status. ROS:  General:  No fevers, no chills  Respiratory:  No shortness of breath  Neurologic:  No numbness, no weakness  Extremities:  No edema, no pain  Skin:  + rash near tattoo site  Psych: No axienty    Past Medical History:   Diagnosis Date    ADHD (attention deficit hyperactivity disorder)     Mild preeclampsia delivered 1/10/2017     History reviewed. No pertinent surgical history. History reviewed. No pertinent family history.   Social History     Socioeconomic History    Marital status: Single     Spouse name: Not on file    Number of children: Not on file    Years of education: Not on file    Highest education level: Not on file   Occupational History    Not on file   Tobacco Use    Smoking status: Current Every Day Smoker     Packs/day: 0.50     Years: 4.00     Pack years: 2.00     Types: Cigarettes    Smokeless tobacco: Former User   Vaping Use    Vaping Use: Former    Substances: Always   Substance and Sexual Activity    Alcohol use: No    Drug use: Yes     Frequency: 2.0 times per week     Types: Marijuana    Sexual activity: Yes     Partners: Female   Other Topics Concern    Not on file   Social History Narrative    Not on file     Social Determinants of Health     Financial Resource Strain:     Difficulty of Paying Living Expenses:    Food Insecurity:     Worried About Running Out of Food in the Last Year:     [address] in the Last Year:    Transportation Needs:     Lack of Transportation (Medical):  Lack of Transportation (Non-Medical):    Physical Activity:     Days of Exercise per Week:     Minutes of Exercise per Session:    Stress:     Feeling of Stress :    Social Connections:     Frequency of Communication with Friends and Family:     Frequency of Social Gatherings with Friends and Family:     Attends [employer] Services:     Active Member of Clubs or Organizations:     Attends Club or Organization Meetings:     Marital Status:    Intimate Partner Violence:     Fear of Current or Ex-Partner:     Emotionally Abused:     Physically Abused:     Sexually Abused:      Current Facility-Administered Medications   Medication Dose Route Frequency Provider Last Rate Last Admin    Tetanus-Diphth-Acell Pertussis (BOOSTRIX) injection 0.5 mL  0.5 mL Intramuscular Once Byron Levi MD        doxycycline monohydrate (ADOXA) tablet 100 mg  100 mg Oral Once Byron Levi MD        bacitracin zinc ointment   Topical Once Byron Levi MD         Current Outpatient Medications   Medication Sig Dispense Refill    doxycycline hyclate (VIBRA-TABS) 100 MG tablet Take 1 tablet by mouth 2 times daily for 7 days 14 tablet 0     No Known Allergies    Nursing Notes Reviewed    Physical Exam:  ED Triage Vitals [07/22/21 1439]   Enc Vitals Group      /68      Pulse 63      Resp 16      Temp 98.2 °F (36.8 °C)      Temp src       SpO2 97 %      Weight 115 lb (52.2 kg)      Height 5' 2\" (1.575 m)      Head Circumference       Peak Flow       Pain Score       Pain Loc       Pain Edu? Excl. in [address]? My pulse ox interpretation is - normal    General appearance:  No acute distress.  Sitting comfortably in bed. Skin:  Warm. Dry. There is erythema and a mild amount of induration to the lateral aspects of patient's Batman tattoo to her left anterior thigh. There is some scabbing and granulation tissue present. There is no underlying fluctuance or active drainage at this time. No lymphangitic streaking up the leg. Eye:  Extraocular movements intact. Ears, nose, mouth and throat:  Oral mucosa moist   Extremity:  No swelling. Normal ROM. Skin findings left anterior thigh as above. Heart:  Strong and symmetric peripheral pulses. Extremities are well perfused. Abdomen:  Non-distended. Respiratory:  Respirations nonlabored. Neurological:  Alert and oriented times 3. No focal neuro deficits. Sensation intact to light touch to distal upper/lower extremities; 5/5 and symmetric  and dorsi/plantar flexion. I have reviewed and interpreted all of the currently available lab results from this visit (if applicable):  No results found for this visit on 07/22/21. Radiographs (if obtained):  [] The following radiograph was interpreted by myself in the absence of a radiologist:   [] Radiologist's Report Reviewed:  No orders to display         EKG (if obtained): (All EKG's are interpreted by myself in the absence of a cardiologist)    Chart review shows recent radiographs:  No results found. MDM:  Pt presents as above. Emergent conditions considered. Presentation prompted initial history and physical.  Presentation consistent with infected tattoo and developing cellulitis. Patient was placed on doxycycline the first dose given the emergency department. Tdap was updated. Encourage warm compresses and continue moisturizers at home. Icing of the area is also discussed. I discussed specific signs and symptoms and when to return to the emergency department as well as need for close outpatient follow-up. Questions sought and answered with the patient.  They voice understanding and agree with plan. Clinical Impression:  1. Cellulitis of left lower extremity      Disposition referral (if applicable): [facility]  [phone]  Schedule an appointment as soon as possible for a visit   [address] PRIMARY CARE PROVIDER (SEE BELOW)    [facility]  [address]  [address] [facility]  [phone]  Today  If symptoms worsen    Disposition medications (if applicable):  New Prescriptions    DOXYCYCLINE HYCLATE (VIBRA-TABS) 100 MG TABLET    Take 1 tablet by mouth 2 times daily for 7 days       Comment: Please note this report has been produced using speech recognition software and may contain errors related to that system including errors in grammar, punctuation, and spelling, as well as words and phrases that may be inappropriate. If there are any questions or concerns please feel free to contact the dictating provider for clarification.          Tala Cabral MD  07/22/21 6487

## 2021-12-07 ENCOUNTER — HOSPITAL ENCOUNTER (EMERGENCY)
Age: 26
Discharge: HOME | End: 2021-12-08
Attending: EMERGENCY MEDICINE
Payer: COMMERCIAL

## 2021-12-07 ENCOUNTER — APPOINTMENT (OUTPATIENT)
Dept: GENERAL RADIOLOGY | Age: 26
End: 2021-12-07
Payer: COMMERCIAL

## 2021-12-07 VITALS
HEART RATE: 68 BPM | OXYGEN SATURATION: 96 % | SYSTOLIC BLOOD PRESSURE: 108 MMHG | RESPIRATION RATE: 16 BRPM | TEMPERATURE: 95.7 F | WEIGHT: 115 LBS | DIASTOLIC BLOOD PRESSURE: 60 MMHG | BODY MASS INDEX: 21.16 KG/M2 | HEIGHT: 62 IN

## 2021-12-07 DIAGNOSIS — R07.89 MUSCULOSKELETAL CHEST PAIN: Primary | ICD-10-CM

## 2021-12-07 PROCEDURE — 96372 THER/PROPH/DIAG INJ SC/IM: CPT

## 2021-12-07 PROCEDURE — 71046 X-RAY EXAM CHEST 2 VIEWS: CPT

## 2021-12-07 PROCEDURE — 99284 EMERGENCY DEPT VISIT MOD MDM: CPT

## 2021-12-07 RX ORDER — KETOROLAC TROMETHAMINE 30 MG/ML
30 INJECTION, SOLUTION INTRAMUSCULAR; INTRAVENOUS ONCE
Status: COMPLETED | OUTPATIENT
Start: 2021-12-08 | End: 2021-12-08

## 2021-12-07 RX ORDER — METHOCARBAMOL 500 MG/1
500 TABLET, FILM COATED ORAL ONCE
Status: COMPLETED | OUTPATIENT
Start: 2021-12-08 | End: 2021-12-08

## 2021-12-07 ASSESSMENT — PAIN DESCRIPTION - FREQUENCY: FREQUENCY: CONTINUOUS

## 2021-12-07 ASSESSMENT — PAIN DESCRIPTION - ORIENTATION: ORIENTATION: LEFT

## 2021-12-07 ASSESSMENT — PAIN SCALES - GENERAL: PAINLEVEL_OUTOF10: 8

## 2021-12-07 ASSESSMENT — PAIN DESCRIPTION - ONSET: ONSET: ON-GOING

## 2021-12-07 ASSESSMENT — PAIN DESCRIPTION - PAIN TYPE: TYPE: ACUTE PAIN

## 2021-12-07 ASSESSMENT — PAIN DESCRIPTION - PROGRESSION: CLINICAL_PROGRESSION: NOT CHANGED

## 2021-12-07 ASSESSMENT — PAIN DESCRIPTION - LOCATION: LOCATION: BREAST;RIB CAGE

## 2021-12-07 NOTE — Clinical Note
Eliot Munson was seen and treated in our emergency department on 12/7/2021. She may return to work on 12/09/2021. If you have any questions or concerns, please don't hesitate to call.       Shashi Rinaldi MD

## 2021-12-07 NOTE — Clinical Note
Benson Davis was seen and treated in our emergency department on 12/7/2021. She may return to work on 12/09/2021. If you have any questions or concerns, please don't hesitate to call.       Pj Thomas MD

## 2021-12-08 PROCEDURE — 6360000002 HC RX W HCPCS: Performed by: EMERGENCY MEDICINE

## 2021-12-08 PROCEDURE — 6370000000 HC RX 637 (ALT 250 FOR IP): Performed by: EMERGENCY MEDICINE

## 2021-12-08 RX ORDER — KETOROLAC TROMETHAMINE 10 MG/1
10 TABLET, FILM COATED ORAL
Qty: 20 TABLET | Refills: 0 | Status: SHIPPED | OUTPATIENT
Start: 2021-12-08 | End: 2022-02-16

## 2021-12-08 RX ORDER — METHOCARBAMOL 500 MG/1
500 TABLET, FILM COATED ORAL
Qty: 9 TABLET | Refills: 0 | Status: SHIPPED | OUTPATIENT
Start: 2021-12-08 | End: 2021-12-11

## 2021-12-08 RX ADMIN — KETOROLAC TROMETHAMINE 30 MG: 30 INJECTION, SOLUTION INTRAMUSCULAR at 00:20

## 2021-12-08 RX ADMIN — METHOCARBAMOL 500 MG: 500 TABLET ORAL at 00:20

## 2021-12-08 ASSESSMENT — PAIN SCALES - GENERAL: PAINLEVEL_OUTOF10: 9

## 2021-12-08 NOTE — ED PROVIDER NOTES
Emergency [facility] DEPARTMENT    Patient: Portia Burks  MRN: [id number]  : 1995  Date of Evaluation: 2021  ED Provider: Amelia Guardado MD    Chief Complaint       Chief Complaint   Patient presents with    Breast Pain     Left over a week while lifting heavy objects    Rib Pain     L rib pain     Gerardo Betancur is a 23 y.o. female who presents to the emergency department with report that she was heavy lifting a coffee machine at work. The patient now reports pain at the left anterior chest wall extending to the left lateral breast and axilla. There is no shortness of breath. The pain is worse with movement. She also stated the pain extends to the left rib cage. There is no shortness of breath although it does hurt to breathe. ROS:     At least 10 systems reviewed and otherwise acutely negative except as in the [address] Past History     Past Medical History:   Diagnosis Date    ADHD (attention deficit hyperactivity disorder)     Mild preeclampsia delivered 1/10/2017     No past surgical history on file.   Social History     Socioeconomic History    Marital status: Single     Spouse name: Not on file    Number of children: Not on file    Years of education: Not on file    Highest education level: Not on file   Occupational History    Not on file   Tobacco Use    Smoking status: Current Every Day Smoker     Packs/day: 0.50     Years: 4.00     Pack years: 2.00     Types: Cigarettes    Smokeless tobacco: Former User   Vaping Use    Vaping Use: Former    Substances: Always   Substance and Sexual Activity    Alcohol use: No    Drug use: Yes     Frequency: 2.0 times per week     Types: Marijuana [employer])    Sexual activity: Yes     Partners: Female   Other Topics Concern    Not on file   Social History Narrative    Not on file     Social Determinants of Health     Financial Resource Strain:     Difficulty of Paying Living Expenses: Not on file   Food Insecurity:     Worried About Running Out of Food in the Last Year: Not on file    Ross of Food in the Last Year: Not on file   Transportation Needs:     Lack of Transportation (Medical): Not on file    Lack of Transportation (Non-Medical): Not on file   Physical Activity:     Days of Exercise per Week: Not on file    Minutes of Exercise per Session: Not on file   Stress:     Feeling of Stress : Not on file   Social Connections:     Frequency of Communication with Friends and Family: Not on file    Frequency of Social Gatherings with Friends and Family: Not on file    Attends Faith Services: Not on file    Active Member of [address] [place] [address] [employer] or Organizations: Not on file    Attends Club or Organization Meetings: Not on file    Marital Status: Not on file   Intimate Partner Violence:     Fear of Current or Ex-Partner: Not on file    Emotionally Abused: Not on file    Physically Abused: Not on file    Sexually Abused: Not on file   Housing Stability:     Unable to Pay for Housing in the Last Year: Not on file    Number of [place] in the Last Year: Not on file    Unstable Housing in the Last Year: Not on file       Medications/Allergies     Previous Medications    No medications on file     No Known Allergies     Physical Exam       ED Triage Vitals [12/07/21 2012]   BP Temp Temp Source Pulse Resp SpO2 Height Weight   108/60 95.7 °F (35.4 °C) Oral 68 16 96 % 5' 2\" (1.575 m) 115 lb (52.2 kg)     GENERAL APPEARANCE: Awake and alert. Cooperative. No acute distress. No obvious discomfort. HEAD: Normocephalic. Atraumatic. EYES: Sclera anicteric. ENT: Tolerates saliva. No trismus. NECK: Supple. Trachea midline. CARDIO: RRR. Radial pulse 2+. No audible murmur. LUNGS: Respirations unlabored. CTAB and symmetrical..  ABDOMEN: Soft. Non-distended. Non-tender.     Breast: Chaperoned exam revealed no discoloration, pitting, swelling, tenderness, nipple drainage, or palpable mass of the bilateral breasts. EXTREMITIES: No acute deformities. No clubbing, edema, cyanosis. SKIN: Warm and dry. NEUROLOGICAL: No gross facial drooping. Moves all 4 extremities spontaneously. PSYCHIATRIC: Normal mood. Diagnostics   Labs:  No results found for this visit on 12/07/21. Radiographs:  XR CHEST (2 VW)    Result Date: 12/7/2021  EXAMINATION: TWO XRAY VIEWS OF THE CHEST 12/7/2021 8:36 pm COMPARISON: None HISTORY: ORDERING SYSTEM PROVIDED HISTORY: Rib pain and chest pain after lifting a very heavy object TECHNOLOGIST PROVIDED HISTORY: Reason for exam:->Rib pain and chest pain after lifting a very heavy object Reason for Exam: Rib pain and chest pain after lifting a very heavy object Acuity: Acute Type of Exam: Initial Additional signs and symptoms: Rib pain and chest pain after lifting a very heavy object FINDINGS: Cardiomediastinal silhouette is normal in size. The lungs are clear. No pleural effusion or pneumothorax is present. No acute cardiopulmonary process. Procedures/EKG:   None    ED Course and MDM   In brief, Anabell Redding is a 23 y.o. female who presented to the emergency department with report of left-sided chest wall, breast, and rib cage pain after heavy lifting at work. Chest x-ray demonstrated no acute cardiopulmonary process. The patient was taught how to do a self breast exam at home. She is to do these monthly. She will have a prescription for Toradol and Robaxin. She is to stay well-hydrated. The patient is to follow-up with the PCP in the next 2 to 4 days. She is encouraged to return to the emergency department for any worsening or concerning symptoms. At the time of disposition, she is stable and in no acute distress or obvious discomfort. Her pain was likely secondary to musculoskeletal strain with intermittent muscle spasms.     ED Medication Orders (From admission, onward)    Start Ordered     Status Ordering Provider    12/08/21 0000 12/07/21 3840

## 2021-12-08 NOTE — ED TRIAGE NOTES
Pt from home states L sided rib pain and breast pain after lifting heavy boxes over a week ago.  A&Ox4

## 2022-01-04 ENCOUNTER — APPOINTMENT (OUTPATIENT)
Dept: GENERAL RADIOLOGY | Age: 27
End: 2022-01-04
Payer: COMMERCIAL

## 2022-01-04 ENCOUNTER — HOSPITAL ENCOUNTER (EMERGENCY)
Age: 27
Discharge: HOME | End: 2022-01-04
Attending: EMERGENCY MEDICINE
Payer: COMMERCIAL

## 2022-01-04 VITALS
SYSTOLIC BLOOD PRESSURE: 108 MMHG | TEMPERATURE: 97.6 F | BODY MASS INDEX: 20.73 KG/M2 | DIASTOLIC BLOOD PRESSURE: 63 MMHG | OXYGEN SATURATION: 96 % | WEIGHT: 117 LBS | HEART RATE: 69 BPM | RESPIRATION RATE: 18 BRPM | HEIGHT: 63 IN

## 2022-01-04 DIAGNOSIS — J02.9 SORE THROAT: Primary | ICD-10-CM

## 2022-01-04 DIAGNOSIS — R07.89 CHEST WALL PAIN: ICD-10-CM

## 2022-01-04 DIAGNOSIS — M79.641 PAIN OF RIGHT HAND: ICD-10-CM

## 2022-01-04 LAB — HETEROPHILE ANTIBODIES: NEGATIVE

## 2022-01-04 PROCEDURE — 73130 X-RAY EXAM OF HAND: CPT

## 2022-01-04 PROCEDURE — 86318 IA INFECTIOUS AGENT ANTIBODY: CPT

## 2022-01-04 PROCEDURE — 6360000002 HC RX W HCPCS: Performed by: EMERGENCY MEDICINE

## 2022-01-04 PROCEDURE — 87430 STREP A AG IA: CPT

## 2022-01-04 PROCEDURE — 87081 CULTURE SCREEN ONLY: CPT

## 2022-01-04 PROCEDURE — 99284 EMERGENCY DEPT VISIT MOD MDM: CPT

## 2022-01-04 PROCEDURE — 6370000000 HC RX 637 (ALT 250 FOR IP): Performed by: EMERGENCY MEDICINE

## 2022-01-04 PROCEDURE — 71046 X-RAY EXAM CHEST 2 VIEWS: CPT

## 2022-01-04 RX ORDER — NAPROXEN 500 MG/1
500 TABLET ORAL ONCE
Status: COMPLETED | OUTPATIENT
Start: 2022-01-04 | End: 2022-01-04

## 2022-01-04 RX ADMIN — NAPROXEN 500 MG: 500 TABLET ORAL at 20:19

## 2022-01-04 RX ADMIN — DEXAMETHASONE 10 MG: 4 TABLET ORAL at 20:19

## 2022-01-04 ASSESSMENT — PAIN DESCRIPTION - LOCATION
LOCATION_2: CHEST
LOCATION: THROAT

## 2022-01-04 ASSESSMENT — PAIN DESCRIPTION - ORIENTATION: ORIENTATION_2: LEFT

## 2022-01-04 ASSESSMENT — PAIN DESCRIPTION - FREQUENCY: FREQUENCY: CONTINUOUS

## 2022-01-04 ASSESSMENT — PAIN DESCRIPTION - PAIN TYPE: TYPE: ACUTE PAIN

## 2022-01-04 ASSESSMENT — PAIN SCALES - GENERAL
PAINLEVEL_OUTOF10: 4
PAINLEVEL_OUTOF10: 4

## 2022-01-04 ASSESSMENT — PAIN DESCRIPTION - DESCRIPTORS: DESCRIPTORS: BURNING;STABBING;JABBING

## 2022-01-04 ASSESSMENT — PAIN DESCRIPTION - INTENSITY: RATING_2: 7

## 2022-01-04 NOTE — Clinical Note
Babar Fleming was seen and treated in our emergency department on 1/4/2022. She may return to work on 01/05/2022. If you have any questions or concerns, please don't hesitate to call.       Denisse Joseph MD

## 2022-01-05 NOTE — ED NOTES
Pt to xray, via wheelchair, with xray tech.      Summer Rodriguez RN  16/13/43 Segundo Cruz RN  27/03/16 1927
admission

## 2022-01-05 NOTE — ED PROVIDER NOTES
EMERGENCY DEPARTMENT ENCOUNTER    Patient: Fawn Dukes  MRN: [id number]  : 1995  Date of Evaluation: 2022  ED Provider:  Yash Monroy MD    CHIEF COMPLAINT  Chief Complaint   Patient presents with    Pharyngitis     States thinks she may have strep throat.  also has pain under her left breast.  was seen prior for that pain but seems to be getting worse and moving more down her ribs.  is taking not taking anything for the pain.  Chest Pain       HPI  Fawn Dukes is a 27 y.o. female who presents with moderate severity, constant sore throat worsened with swallowing but denies any difficulty swallowing or breathing with onset over the last 24 hours. Has some associated generalized fatigue and malaise with that as well. Denies fever or cough. She reports that she also has a chronic lateral left chest wall pain beneath the left breast which she has had for 5 years. It is worsened with movement and with deep inspiration. She does not take any medications for this at home. She is also complaining of some pain around the third and fourth MCP joints of the right hand after punching a wall about 2 weeks ago. Is mild to moderate aching pain worse with movement of the fingers but denies any decreased range of motion or loss of sensation or focal weakness. Denies any other associated symptoms or complaints or concerns.       REVIEW OF SYSTEMS    Constitutional: negative for fever, chills  Neurological: negative for HA, focal weakness, loss of sensation  Ophthalmic: negative for vision change  ENT: negative for congestion, rhinorrhea  Cardiovascular: negative for palpitations, edema  Respiratory: negative for SOB, cough  GI: negative for abdominal pain, nausea, vomiting, diarrhea, constipation  : negative for dysuria, hematuria  Musculoskeletal: negative for myalgias, decreased ROM, joint swelling  Dermatological: negative for rash, wounds  Heme: Negative for bleeding, bruising      PAST MEDICAL HISTORY  Past Medical History:   Diagnosis Date    ADHD (attention deficit hyperactivity disorder)     Mild preeclampsia delivered 1/10/2017       CURRENT MEDICATIONS  [unfilled]    ALLERGIES  No Known Allergies    SURGICAL HISTORY  History reviewed. No pertinent surgical history. FAMILY HISTORY  History reviewed. No pertinent family history. SOCIAL HISTORY  Social History     Socioeconomic History    Marital status: Single     Spouse name: None    Number of children: None    Years of education: None    Highest education level: None   Occupational History    None   Tobacco Use    Smoking status: Current Every Day Smoker     Packs/day: 0.50     Years: 4.00     Pack years: 2.00     Types: Cigarettes    Smokeless tobacco: Former User   Vaping Use    Vaping Use: Former    Substances: Always   Substance and Sexual Activity    Alcohol use: No    Drug use: Yes     Frequency: 2.0 times per week     Types: Marijuana [facility])    Sexual activity: Yes     Partners: Female   Other Topics Concern    None   Social History Narrative    None     Social Determinants of Health     Financial Resource Strain:     Difficulty of Paying Living Expenses: Not on file   Food Insecurity:     Worried About Running Out of Food in the Last Year: Not on file    Ross of Food in the Last Year: Not on file   Transportation Needs:     Lack of Transportation (Medical): Not on file    Lack of Transportation (Non-Medical):  Not on file   Physical Activity:     Days of Exercise per Week: Not on file    Minutes of Exercise per Session: Not on file   Stress:     Feeling of Stress : Not on file   Social Connections:     Frequency of Communication with Friends and Family: Not on file    Frequency of Social Gatherings with Friends and Family: Not on file    Attends Mormon Services: Not on file    Active Member of Clubs or Organizations: Not on file    Attends Club or Organization Meetings: Not on file    Marital Status: Not on file   Intimate Partner Violence:     Fear of Current or Ex-Partner: Not on file    Emotionally Abused: Not on file    Physically Abused: Not on file    Sexually Abused: Not on file   Housing Stability:     Unable to Pay for Housing in the Last Year: Not on file    Number of [place] in the Last Year: Not on file    Unstable Housing in the Last Year: Not on file         **Past medical, family and social histories, and nursing notes reviewed and verified by me**      PHYSICAL EXAM  VITAL SIGNS:   ED Triage Vitals [01/04/22 1843]   Enc Vitals Group      BP (!) 108/58      Pulse 68      Resp 14      Temp 97.4 °F (36.3 °C)      Temp Source Oral      SpO2 98 %      Weight 117 lb (53.1 kg)      Height 5' 3\" (1.6 m)      Head Circumference       Peak Flow       Pain Score       Pain Loc       Pain Edu? Excl. in [address]? Vitals during ED course were reviewed and are as charted. Constitutional: Minimal distress, Non-toxic appearance  Eyes: Conjunctiva normal, No discharge  HENT: Normocephalic, Atraumatic, bilateral external ears normal, posterior oropharynx is erythematous but without exudate, uvula is midline, no trismus, no \"hot potato voice\" or dysphonia, oropharynx moist  Neck: Supple, no nuchal rigidity/meningismus, the submandibular space is soft and non-tender, no stridor, no grossly visible or palpable masses  Cardiovascular: Regular rate and rhythm, No murmurs, No rubs, No gallops  Pulmonary/Chest: No tenderness palpation and no crepitus, normal breath sounds, No respiratory distress or accessory muscle use, No wheezing, crackles or rhonchi.   Abdomen: Soft, nondistended and nonrigid, No tenderness or peritoneal signs, No masses, normal bowel sounds  Extremities: No gross deformities, no edema, no tenderness, normal range of motion of all fingers, cap refill less than 2 seconds in the fingertips  Neurologic: Normal motor function, Normal sensory function, No focal deficits  Skin: Warm, Dry, No erythema, No rash, No cyanosis, No mottling  Lymphatic: No lymphadenopathy in the following location(s): cervical  Psychiatric: Alert and oriented x3, Affect normal            RADIOLOGY/PROCEDURES/LABS/MEDICATIONS ADMINISTERED:    I have reviewed and interpreted all of the currently available lab results from this visit (if applicable):  Results for orders placed or performed during the hospital encounter of 01/04/22   Strep screen Group A  - Throat    Specimen: Throat   Result Value Ref Range    Specimen THROAT     Special Requests NONE     Strep A Direct Screen NEGATIVE    Mononucleosis Screen   Result Value Ref Range    Monospot NEGATIVE NEGATIVE          ABNORMAL LABS:  Labs Reviewed   STREP SCREEN GROUP A THROAT    Narrative:     SETUP DATE/TIME:     MONONUCLEOSIS SCREEN         IMAGING STUDIES ORDERED:  XR CHEST (2 VW)  XR HAND RIGHT (MIN 3 VIEWS)    I have personally viewed the imaging studies. The radiologist interpretation is:   XR HAND RIGHT (MIN 3 VIEWS)   Final Result   1. No acute fracture identified. 2. Dorsal soft tissue swelling. XR CHEST (2 VW)   Final Result   No acute cardiopulmonary abnormality. MEDICATIONS ADMINISTERED:  Medications   naproxen (NAPROSYN) tablet 500 mg (500 mg Oral Given 1/4/22 2019)   dexamethasone (DECADRON) tablet 10 mg (10 mg Oral Given 1/4/22 2019)         COURSE & MEDICAL DECISION MAKING  Last vitals: /63   Pulse 69   Temp 97.6 °F (36.4 °C) (Oral)   Resp 18   Ht 5' 3\" (1.6 m)   Wt 117 lb (53.1 kg)   LMP 12/20/2021   SpO2 96%   BMI 20.73 kg/m²     63-year-old female with sore throat. Likely viral.  Negative for strep pharyngitis. Negative for mononucleosis. Differential diagnoses considered included, but were not limited to, viral and bacterial pharyngitis, retropharyngeal or peritonsillar abscess, epiglottitis, bacterial tracheitis and an acute intrathoracic process.       Also has chronic left-sided chest wall pain. Most likely musculoskeletal versus costochondritis. Has pain on right hand after punching wall about 2 weeks ago but there is no clinical or radiographic evidence for fracture or dislocation. Neurovascularly intact. Additional workup and treatment in the ED as documented above. Patient reassured and will be discharged home. I have explained to the patient in appropriate terminology our work-up in the ED and their diagnosis. I have also given anticipatory guidance and expectant management of their condition as an outpatient as per my custom. The patient was given clear discharge and follow-up instructions including return to the ER immediately for worsening concerns. The patient has been advised to follow-up with their primary care physician and/or referred physician in the next two to three days or sooner if worsening and to return to the ER immediately as above with any concerns. I provided the patient counseling with regard to my customary list of strict return precautions as well as return precautions specific to the cause for today's emergency department visit. The patient will return under these provided conditions, but should also return for new concerns or further worsening. Pt and/or family understand and agree with plan. Clinical Impression:  1. Sore throat    2. Chest wall pain    3. Pain of right hand        Disposition referral (if applicable): Your Primary care provider    Schedule an appointment as soon as possible for a visit       [facility] Emergency Department  [address] [facility]  [phone]    If symptoms worsen      Disposition medications (if applicable):  Discharge Medication List as of 1/4/2022  8:28 PM          ED Provider Disposition Time  DISPOSITION            Electronically signed by: Krunal Sahu M.D., 1/4/2022 9:34 PM      This dictation was created with voice recognition software.  While attempts have been made to review the dictation as it is transcribed, on occasion the spoken word can be misinterpreted by the technology leading to omissions or inappropriate words, phrases or sentences.         Ruby Gottron, MD  01/04/22 5600

## 2022-01-06 LAB
CULTURE: NORMAL
Lab: NORMAL
SPECIMEN: NORMAL
STREP A DIRECT SCREEN: NEGATIVE

## 2022-02-16 ENCOUNTER — APPOINTMENT (OUTPATIENT)
Dept: CT IMAGING | Age: 27
End: 2022-02-16
Payer: COMMERCIAL

## 2022-02-16 ENCOUNTER — APPOINTMENT (OUTPATIENT)
Dept: GENERAL RADIOLOGY | Age: 27
End: 2022-02-16
Payer: COMMERCIAL

## 2022-02-16 ENCOUNTER — HOSPITAL ENCOUNTER (EMERGENCY)
Age: 27
Discharge: HOME | End: 2022-02-16
Attending: EMERGENCY MEDICINE
Payer: COMMERCIAL

## 2022-02-16 VITALS
BODY MASS INDEX: 21.53 KG/M2 | HEIGHT: 62 IN | DIASTOLIC BLOOD PRESSURE: 63 MMHG | HEART RATE: 50 BPM | TEMPERATURE: 98.1 F | SYSTOLIC BLOOD PRESSURE: 103 MMHG | OXYGEN SATURATION: 97 % | RESPIRATION RATE: 16 BRPM | WEIGHT: 117 LBS

## 2022-02-16 DIAGNOSIS — R14.1 GAS PAIN: Primary | ICD-10-CM

## 2022-02-16 DIAGNOSIS — K59.00 CONSTIPATION, UNSPECIFIED CONSTIPATION TYPE: ICD-10-CM

## 2022-02-16 LAB
ALBUMIN SERPL-MCNC: 4.3 GM/DL (ref 3.4–5)
ALP BLD-CCNC: 67 IU/L (ref 40–129)
ALT SERPL-CCNC: 6 U/L (ref 10–40)
ANION GAP SERPL CALCULATED.3IONS-SCNC: 10 MMOL/L (ref 4–16)
AST SERPL-CCNC: 13 IU/L (ref 15–37)
BACTERIA: NEGATIVE /HPF
BASOPHILS ABSOLUTE: 0 K/CU MM
BASOPHILS RELATIVE PERCENT: 0.2 % (ref 0–1)
BILIRUB SERPL-MCNC: 0.2 MG/DL (ref 0–1)
BILIRUBIN URINE: NEGATIVE MG/DL
BLOOD, URINE: NEGATIVE
BUN BLDV-MCNC: 9 MG/DL (ref 6–23)
CALCIUM SERPL-MCNC: 8.8 MG/DL (ref 8.3–10.6)
CAST TYPE: NEGATIVE /HPF
CHLORIDE BLD-SCNC: 105 MMOL/L (ref 99–110)
CLARITY: CLEAR
CO2: 22 MMOL/L (ref 21–32)
COLOR: YELLOW
CREAT SERPL-MCNC: 0.7 MG/DL (ref 0.6–1.1)
CRYSTAL TYPE: NEGATIVE /HPF
DIFFERENTIAL TYPE: ABNORMAL
EOSINOPHILS ABSOLUTE: 0.2 K/CU MM
EOSINOPHILS RELATIVE PERCENT: 2 % (ref 0–3)
EPITHELIAL CELLS, UA: NEGATIVE /HPF
GFR AFRICAN AMERICAN: >60 ML/MIN/1.73M2
GFR NON-AFRICAN AMERICAN: >60 ML/MIN/1.73M2
GLUCOSE BLD-MCNC: 97 MG/DL (ref 70–99)
GLUCOSE, URINE: NEGATIVE MG/DL
GONADOTROPIN, CHORIONIC (HCG) QUANT: 0.5 UIU/ML
HCT VFR BLD CALC: 43.6 % (ref 37–47)
HEMOGLOBIN: 14.3 GM/DL (ref 12.5–16)
IMMATURE NEUTROPHIL %: 0.1 % (ref 0–0.43)
KETONES, URINE: NEGATIVE MG/DL
LEUKOCYTE ESTERASE, URINE: NEGATIVE
LIPASE: 16 IU/L (ref 13–60)
LYMPHOCYTES ABSOLUTE: 2 K/CU MM
LYMPHOCYTES RELATIVE PERCENT: 21.6 % (ref 24–44)
MCH RBC QN AUTO: 31.4 PG (ref 27–31)
MCHC RBC AUTO-ENTMCNC: 32.8 % (ref 32–36)
MCV RBC AUTO: 95.8 FL (ref 78–100)
MONOCYTES ABSOLUTE: 1 K/CU MM
MONOCYTES RELATIVE PERCENT: 10.3 % (ref 0–4)
NITRITE URINE, QUANTITATIVE: NEGATIVE
PDW BLD-RTO: 12.5 % (ref 11.7–14.9)
PH, URINE: 6 (ref 5–8)
PLATELET # BLD: 209 K/CU MM (ref 140–440)
PMV BLD AUTO: 10.2 FL (ref 7.5–11.1)
POTASSIUM SERPL-SCNC: 4 MMOL/L (ref 3.5–5.1)
PROTEIN UA: NEGATIVE MG/DL
RBC # BLD: 4.55 M/CU MM (ref 4.2–5.4)
RBC URINE: NEGATIVE /HPF (ref 0–6)
SEGMENTED NEUTROPHILS ABSOLUTE COUNT: 6.1 K/CU MM
SEGMENTED NEUTROPHILS RELATIVE PERCENT: 65.8 % (ref 36–66)
SODIUM BLD-SCNC: 137 MMOL/L (ref 135–145)
SPECIFIC GRAVITY UA: 1.01 (ref 1–1.03)
TOTAL IMMATURE NEUTOROPHIL: 0.01 K/CU MM
TOTAL PROTEIN: 6.1 GM/DL (ref 6.4–8.2)
UROBILINOGEN, URINE: 0.2 MG/DL (ref 0.2–1)
WBC # BLD: 9.2 K/CU MM (ref 4–10.5)
WBC UA: NEGATIVE /HPF (ref 0–5)

## 2022-02-16 PROCEDURE — 74177 CT ABD & PELVIS W/CONTRAST: CPT

## 2022-02-16 PROCEDURE — 96374 THER/PROPH/DIAG INJ IV PUSH: CPT

## 2022-02-16 PROCEDURE — 71046 X-RAY EXAM CHEST 2 VIEWS: CPT

## 2022-02-16 PROCEDURE — 99284 EMERGENCY DEPT VISIT MOD MDM: CPT

## 2022-02-16 PROCEDURE — 81001 URINALYSIS AUTO W/SCOPE: CPT

## 2022-02-16 PROCEDURE — 84702 CHORIONIC GONADOTROPIN TEST: CPT

## 2022-02-16 PROCEDURE — 6360000004 HC RX CONTRAST MEDICATION: Performed by: EMERGENCY MEDICINE

## 2022-02-16 PROCEDURE — 83690 ASSAY OF LIPASE: CPT

## 2022-02-16 PROCEDURE — 80053 COMPREHEN METABOLIC PANEL: CPT

## 2022-02-16 PROCEDURE — 96375 TX/PRO/DX INJ NEW DRUG ADDON: CPT

## 2022-02-16 PROCEDURE — 6360000002 HC RX W HCPCS: Performed by: EMERGENCY MEDICINE

## 2022-02-16 PROCEDURE — 85025 COMPLETE CBC W/AUTO DIFF WBC: CPT

## 2022-02-16 RX ORDER — DIPHENHYDRAMINE HYDROCHLORIDE 50 MG/ML
50 INJECTION INTRAMUSCULAR; INTRAVENOUS ONCE
Status: COMPLETED | OUTPATIENT
Start: 2022-02-16 | End: 2022-02-16

## 2022-02-16 RX ORDER — POLYETHYLENE GLYCOL 3350 17 G/17G
17 POWDER, FOR SOLUTION ORAL DAILY
Qty: 1530 G | Refills: 1 | Status: SHIPPED | OUTPATIENT
Start: 2022-02-16 | End: 2022-03-18

## 2022-02-16 RX ORDER — MORPHINE SULFATE 4 MG/ML
4 INJECTION, SOLUTION INTRAMUSCULAR; INTRAVENOUS ONCE
Status: COMPLETED | OUTPATIENT
Start: 2022-02-16 | End: 2022-02-16

## 2022-02-16 RX ORDER — METOCLOPRAMIDE HYDROCHLORIDE 5 MG/ML
10 INJECTION INTRAMUSCULAR; INTRAVENOUS ONCE
Status: COMPLETED | OUTPATIENT
Start: 2022-02-16 | End: 2022-02-16

## 2022-02-16 RX ADMIN — DIPHENHYDRAMINE HYDROCHLORIDE 50 MG: 50 INJECTION, SOLUTION INTRAMUSCULAR; INTRAVENOUS at 19:26

## 2022-02-16 RX ADMIN — MORPHINE SULFATE 4 MG: 4 INJECTION INTRAVENOUS at 19:31

## 2022-02-16 RX ADMIN — IOPAMIDOL 100 ML: 755 INJECTION, SOLUTION INTRAVENOUS at 20:40

## 2022-02-16 RX ADMIN — METOCLOPRAMIDE HYDROCHLORIDE 10 MG: 5 INJECTION INTRAMUSCULAR; INTRAVENOUS at 19:36

## 2022-02-16 ASSESSMENT — PAIN SCALES - GENERAL
PAINLEVEL_OUTOF10: 8
PAINLEVEL_OUTOF10: 7
PAINLEVEL_OUTOF10: 1

## 2022-02-16 ASSESSMENT — ENCOUNTER SYMPTOMS
EYES NEGATIVE: 1
SHORTNESS OF BREATH: 0
CONSTIPATION: 1
ABDOMINAL PAIN: 1
RESPIRATORY NEGATIVE: 1

## 2022-02-16 ASSESSMENT — PAIN DESCRIPTION - LOCATION
LOCATION: ABDOMEN
LOCATION: ABDOMEN

## 2022-02-16 ASSESSMENT — PAIN DESCRIPTION - DESCRIPTORS: DESCRIPTORS: OTHER (COMMENT)

## 2022-02-16 ASSESSMENT — PAIN DESCRIPTION - ORIENTATION
ORIENTATION: LEFT;LOWER
ORIENTATION: LEFT;LOWER;MID

## 2022-02-16 ASSESSMENT — PAIN - FUNCTIONAL ASSESSMENT: PAIN_FUNCTIONAL_ASSESSMENT: 0-10

## 2022-02-16 NOTE — Clinical Note
Johana Ray was seen and treated in our emergency department on 2/16/2022. She may return to work on 02/21/2022. If you have any questions or concerns, please don't hesitate to call.       Martina Mello, DO

## 2022-02-17 NOTE — ED PROVIDER NOTES
Attempted to call patient, no answer. left vm to return office call.      file   Food Insecurity:     Worried About Running Out of Food in the Last Year: Not on file    Ross of Food in the Last Year: Not on file   Transportation Needs:     Lack of Transportation (Medical): Not on file    Lack of Transportation (Non-Medical): Not on file   Physical Activity:     Days of Exercise per Week: Not on file    Minutes of Exercise per Session: Not on file   Stress:     Feeling of Stress : Not on file   Social Connections:     Frequency of Communication with Friends and Family: Not on file    Frequency of Social Gatherings with Friends and Family: Not on file    Attends Rastafarian Services: Not on file    Active Member of [address] [place] [address] [employer] or Organizations: Not on file    Attends Club or Organization Meetings: Not on file    Marital Status: Not on file   Intimate Partner Violence:     Fear of Current or Ex-Partner: Not on file    Emotionally Abused: Not on file    Physically Abused: Not on file    Sexually Abused: Not on file   Housing Stability:     Unable to Pay for Housing in the Last Year: Not on file    Number of [place] in the Last Year: Not on file    Unstable Housing in the Last Year: Not on file     History reviewed. No pertinent surgical history. Past Medical History:   Diagnosis Date    ADHD (attention deficit hyperactivity disorder)     Mild preeclampsia delivered 1/10/2017     No Known Allergies  Prior to Admission medications    Medication Sig Start Date End Date Taking? Authorizing Provider   polyethylene glycol [facility]) 17 GM/SCOOP powder Take 17 g by mouth daily 2/16/22 3/18/22 Yes Binnie Cancer Ray, DO       /63   Pulse 50   Temp 98.1 °F (36.7 °C) (Oral)   Resp 16   Ht 5' 2\" (1.575 m)   Wt 117 lb (53.1 kg)   SpO2 97%   BMI 21.40 kg/m²     Physical Exam  Vitals and nursing note reviewed. Constitutional:       Appearance: She is well-developed. HENT:      Head: Normocephalic and atraumatic.       Right Ear: External ear normal.      Left Ear: External ear normal.      Nose: Nose normal.   Eyes:      Conjunctiva/sclera: Conjunctivae normal.      Pupils: Pupils are equal, round, and reactive to light. Cardiovascular:      Rate and Rhythm: Normal rate and regular rhythm. Heart sounds: Normal heart sounds. Pulmonary:      Effort: Pulmonary effort is normal.      Breath sounds: Normal breath sounds. Abdominal:      General: Bowel sounds are normal.      Palpations: Abdomen is soft. Tenderness: There is abdominal tenderness in the left lower quadrant. Musculoskeletal:         General: Normal range of motion. Cervical back: Normal range of motion and neck supple. Skin:     General: Skin is warm and dry. Neurological:      General: No focal deficit present. Mental Status: She is alert and oriented to person, place, and time. GCS: GCS eye subscore is 4. GCS verbal subscore is 5. GCS motor subscore is 6. Psychiatric:         Behavior: Behavior normal.         Thought Content: Thought content normal.         Judgment: Judgment normal.         MDM:    Labs Reviewed   CBC WITH AUTO DIFFERENTIAL - Abnormal; Notable for the following components:       Result Value    MCH 31.4 (*)     Lymphocytes % 21.6 (*)     Monocytes % 10.3 (*)     All other components within normal limits   COMPREHENSIVE METABOLIC PANEL - Abnormal; Notable for the following components: Total Protein 6.1 (*)     ALT 6 (*)     AST 13 (*)     All other components within normal limits   URINALYSIS - Abnormal; Notable for the following components:    Color, UA YELLOW (*)     Cast Type NEGATIVE (*)     All other components within normal limits   LIPASE   HCG, QUANTITATIVE, PREGNANCY       CT ABDOMEN PELVIS W IV CONTRAST   Final Result   Mild gas and stool load in the colon particularly the right colon. Findings   suggest mild constipation. No other acute finding in the abdomen or pelvis. 3.8 mm nodule with smooth margins at the left lung base. Follow-up recommendation as below. RECOMMENDATIONS:   Guidelines for follow-up and management of pulmonary nodules found on abdomen   CT:      <6 mm - No follow up recommend on the basis of the estimated low risk of   malignancy. 6-8-mm - recommend follow-up chest CT after an appropriate interval (3-12   months depending on clinical risk). >8mm - immediate chest CT for further evaluation. Radiology 2017 http://pubs. rsna.org/doi/full/10.1148/radiol. 1575236972         XR CHEST (2 VW)   Final Result   No acute abnormality identified. Supportive care. Miralax. Diet modification  I have discussed with the patient  my clinical impression and the result of the patient's current clinical evaluation for their presentation. In addition we discussed the risk and benefits of further testing and hospitalization. I discussed candidly with the patient  and the patient  was allowed to provide input as to their thoughts concerning the current presentation. Although the risk of progression or development of new more serious signs and symptoms cannot be excluded Her blood work and CT are negative except for gas and constipation   My typical dicussion, presentation,and considerations for this patients' chief complaint, diagnosis, and differential diagnosis have been considered. I have stressed need for follow up and reexamination for this encounter. I have discussed my clinical impression and the results of the current evaluation. Patient was  prescribed Miralax. The medication(s) use,  medication(s) safety and medication(s) interactions with already prescribed medication(s) have been explained and outlined for this encounter. The patient  was educated that it is their responsibility to verify this information is correct at the time of discharge and to contact this department of any complications with the pharmacy providing this medication(s) or if their any difficulty in obtaining this medication(s). Final Impression    1. Gas pain    2.  Constipation, unspecified constipation type             [facility],   02/16/22 6682

## 2022-04-20 ENCOUNTER — HOSPITAL ENCOUNTER (EMERGENCY)
Age: 27
Discharge: HOME | End: 2022-04-20
Attending: EMERGENCY MEDICINE
Payer: COMMERCIAL

## 2022-04-20 ENCOUNTER — APPOINTMENT (OUTPATIENT)
Dept: GENERAL RADIOLOGY | Age: 27
End: 2022-04-20
Payer: COMMERCIAL

## 2022-04-20 VITALS
WEIGHT: 117.7 LBS | HEIGHT: 63 IN | SYSTOLIC BLOOD PRESSURE: 114 MMHG | RESPIRATION RATE: 16 BRPM | TEMPERATURE: 98.1 F | HEART RATE: 82 BPM | BODY MASS INDEX: 20.86 KG/M2 | DIASTOLIC BLOOD PRESSURE: 70 MMHG | OXYGEN SATURATION: 97 %

## 2022-04-20 DIAGNOSIS — M25.511 ACUTE PAIN OF RIGHT SHOULDER: Primary | ICD-10-CM

## 2022-04-20 PROCEDURE — 73030 X-RAY EXAM OF SHOULDER: CPT

## 2022-04-20 PROCEDURE — 6370000000 HC RX 637 (ALT 250 FOR IP): Performed by: EMERGENCY MEDICINE

## 2022-04-20 PROCEDURE — 99283 EMERGENCY DEPT VISIT LOW MDM: CPT

## 2022-04-20 RX ORDER — CYCLOBENZAPRINE HCL 10 MG
10 TABLET ORAL 3 TIMES DAILY PRN
Qty: 9 TABLET | Refills: 0 | Status: SHIPPED | OUTPATIENT
Start: 2022-04-20 | End: 2022-04-23

## 2022-04-20 RX ORDER — NAPROXEN 500 MG/1
500 TABLET ORAL 2 TIMES DAILY PRN
Qty: 20 TABLET | Refills: 0 | Status: SHIPPED | OUTPATIENT
Start: 2022-04-20 | End: 2022-05-02

## 2022-04-20 RX ORDER — HYDROCODONE BITARTRATE AND ACETAMINOPHEN 5; 325 MG/1; MG/1
2 TABLET ORAL ONCE
Status: COMPLETED | OUTPATIENT
Start: 2022-04-20 | End: 2022-04-20

## 2022-04-20 RX ADMIN — HYDROCODONE BITARTRATE AND ACETAMINOPHEN 2 TABLET: 5; 325 TABLET ORAL at 02:30

## 2022-04-20 ASSESSMENT — PAIN SCALES - GENERAL: PAINLEVEL_OUTOF10: 10

## 2022-04-20 ASSESSMENT — ENCOUNTER SYMPTOMS
RESPIRATORY NEGATIVE: 1
EYES NEGATIVE: 1
GASTROINTESTINAL NEGATIVE: 1

## 2022-04-20 ASSESSMENT — PAIN DESCRIPTION - FREQUENCY: FREQUENCY: CONTINUOUS

## 2022-04-20 ASSESSMENT — PAIN DESCRIPTION - PROGRESSION: CLINICAL_PROGRESSION: NOT CHANGED

## 2022-04-20 ASSESSMENT — PAIN DESCRIPTION - ORIENTATION: ORIENTATION: RIGHT

## 2022-04-20 ASSESSMENT — PAIN DESCRIPTION - LOCATION: LOCATION: SHOULDER

## 2022-04-20 ASSESSMENT — PAIN DESCRIPTION - PAIN TYPE: TYPE: ACUTE PAIN

## 2022-04-20 ASSESSMENT — PAIN DESCRIPTION - ONSET: ONSET: SUDDEN

## 2022-04-20 ASSESSMENT — PAIN - FUNCTIONAL ASSESSMENT: PAIN_FUNCTIONAL_ASSESSMENT: 0-10

## 2022-04-20 ASSESSMENT — PAIN DESCRIPTION - DESCRIPTORS: DESCRIPTORS: SHARP

## 2022-04-20 NOTE — ED NOTES
Discharge instructions and prescriptions reviewed with pt and verbalizes understanding.      Ivette Kennedy RN  04/20/22 5150

## 2022-04-20 NOTE — ED NOTES
Dr Shine SHEN, [facility] in to discuss test results and plan for discharge.      Anali Schwartz, RN  04/20/22 2843

## 2022-04-20 NOTE — ED PROVIDER NOTES
The history is provided by the patient. Shoulder Problem  Location:  Shoulder  Shoulder location:  R shoulder  Injury: yes    Mechanism of injury comment:  Knocked down by her dog and she hit her shoulder   Pain details:     Quality:  Throbbing and dull    Severity:  Severe    Onset quality:  Sudden    Timing:  Constant    Progression:  Unchanged  Handedness:  Right-handed  Dislocation: no    Prior injury to area:  Yes (Patient states she has dislocated that shoulder when she was 17 and it was never relocated correctly)  Worsened by: Movement  Ineffective treatments:  Rest  Associated symptoms: decreased range of motion and stiffness        Review of Systems   Constitutional: Negative. HENT: Negative. Eyes: Negative. Respiratory: Negative. Cardiovascular: Negative. Gastrointestinal: Negative. Genitourinary: Negative. Musculoskeletal: Positive for stiffness. Skin: Negative. Neurological: Negative. All other systems reviewed and are negative. History reviewed. No pertinent family history.   Social History     Socioeconomic History    Marital status: Single     Spouse name: Not on file    Number of children: Not on file    Years of education: Not on file    Highest education level: Not on file   Occupational History    Not on file   Tobacco Use    Smoking status: Current Every Day Smoker     Packs/day: 0.50     Years: 4.00     Pack years: 2.00     Types: Cigarettes    Smokeless tobacco: Former User   Vaping Use    Vaping Use: Former    Substances: Always   Substance and Sexual Activity    Alcohol use: No    Drug use: Yes     Frequency: 2.0 times per week     Types: Marijuana Jerelyn November)    Sexual activity: Yes     Partners: Female   Other Topics Concern    Not on file   Social History Narrative    Not on file     Social Determinants of Health     Financial Resource Strain:     Difficulty of Paying Living Expenses: Not on file   Food Insecurity:     Worried About Running Out of Food in the Last Year: Not on file    Ran Out of Food in the Last Year: Not on file   Transportation Needs:     Lack of Transportation (Medical): Not on file    Lack of Transportation (Non-Medical): Not on file   Physical Activity:     Days of Exercise per Week: Not on file    Minutes of Exercise per Session: Not on file   Stress:     Feeling of Stress : Not on file   Social Connections:     Frequency of Communication with Friends and Family: Not on file    Frequency of Social Gatherings with Friends and Family: Not on file    Attends Caodaism Services: Not on file    Active Member of [address] [place] [address] [employer] or Organizations: Not on file    Attends Club or Organization Meetings: Not on file    Marital Status: Not on file   Intimate Partner Violence:     Fear of Current or Ex-Partner: Not on file    Emotionally Abused: Not on file    Physically Abused: Not on file    Sexually Abused: Not on file   Housing Stability:     Unable to Pay for Housing in the Last Year: Not on file    Number of [place] in the Last Year: Not on file    Unstable Housing in the Last Year: Not on file     History reviewed. No pertinent surgical history. Past Medical History:   Diagnosis Date    ADHD (attention deficit hyperactivity disorder)     Mild preeclampsia delivered 1/10/2017     No Known Allergies  Prior to Admission medications    Medication Sig Start Date End Date Taking? Authorizing Provider   naproxen (NAPROSYN) 500 MG tablet Take 1 tablet by mouth 2 times daily as needed for Pain 4/20/22 4/30/22 Yes Yessy Covarrubias DO   cyclobenzaprine (FLEXERIL) 10 MG tablet Take 1 tablet by mouth 3 times daily as needed for Muscle spasms 4/20/22 4/23/22 Yes Yessy Covarrubias DO       /70   Pulse 82   Temp 98.1 °F (36.7 °C) (Oral)   Resp 16   Ht 5' 3\" (1.6 m)   Wt 117 lb 11.2 oz (53.4 kg)   LMP 04/12/2022 (Approximate)   SpO2 97%   BMI 20.85 kg/m²     Physical Exam  Vitals and nursing note reviewed. Constitutional:       Appearance: She is well-developed. HENT:      Head: Normocephalic and atraumatic. Right Ear: External ear normal.      Left Ear: External ear normal.      Nose: Nose normal.   Eyes:      Conjunctiva/sclera: Conjunctivae normal.      Pupils: Pupils are equal, round, and reactive to light. Cardiovascular:      Rate and Rhythm: Normal rate and regular rhythm. Heart sounds: Normal heart sounds. Pulmonary:      Effort: Pulmonary effort is normal.      Breath sounds: Normal breath sounds. Abdominal:      General: Bowel sounds are normal.      Palpations: Abdomen is soft. Musculoskeletal:      Right shoulder: Tenderness and bony tenderness present. No swelling, deformity, effusion, laceration or crepitus. Decreased range of motion. Normal strength. Normal pulse. Right upper arm: Normal.      Right elbow: Normal.      Cervical back: Normal, normal range of motion and neck supple. Thoracic back: Normal.   Skin:     General: Skin is warm and dry. Neurological:      Mental Status: She is alert and oriented to person, place, and time. GCS: GCS eye subscore is 4. GCS verbal subscore is 5. GCS motor subscore is 6. Psychiatric:         Behavior: Behavior normal.         Thought Content: Thought content normal.         Judgment: Judgment normal.         MDM:    Labs Reviewed - No data to display    XR SHOULDER RIGHT (MIN 2 VIEWS)   Final Result   1. Unremarkable radiographs of the right shoulder. Patient was given two vicodin and an arm sling which was at her request. I gave her referral to orthopedics on call. There is no injury to bony anatomy. There could be ligamentous or muscular issues but shoulder is not dislocated or fractured  I have discussed with the patient  my clinical impression and the result of the patient's current clinical evaluation for their presentation.  In addition we discussed the risk and benefits of further testing and hospitalization. I discussed candidly with the patient  and the patient  was allowed to provide input as to their thoughts concerning the current presentation. Although the risk of progression or development of new more serious signs and symptoms cannot be excluded there is no immediate injury requiring emergent referral.    My typical dicussion, presentation,and considerations for this patients' chief complaint, diagnosis, and differential diagnosis have been considered. I have stressed need for follow up and reexamination for this encounter. I have discussed my clinical impression and the results of the current evaluation. Patient was  prescribed naprosyn and flexeril. The medication(s) use,  medication(s) safety and medication(s) interactions with already prescribed medication(s) have been explained and outlined for this encounter. The patient  was educated that it is their responsibility to verify this information is correct at the time of discharge and to contact this department of any complications with the pharmacy providing this medication(s) or if their any difficulty in obtaining this medication(s). Final Impression    1.  Acute pain of right shoulder              287 Makenna Langley DO  04/20/22 9895

## 2022-05-02 ENCOUNTER — HOSPITAL ENCOUNTER (EMERGENCY)
Age: 27
Discharge: HOME | End: 2022-05-02
Attending: EMERGENCY MEDICINE
Payer: COMMERCIAL

## 2022-05-02 VITALS
BODY MASS INDEX: 20.73 KG/M2 | SYSTOLIC BLOOD PRESSURE: 107 MMHG | WEIGHT: 117 LBS | RESPIRATION RATE: 16 BRPM | TEMPERATURE: 97.6 F | OXYGEN SATURATION: 97 % | DIASTOLIC BLOOD PRESSURE: 70 MMHG | HEART RATE: 72 BPM | HEIGHT: 63 IN

## 2022-05-02 DIAGNOSIS — L24.9 IRRITANT CONTACT DERMATITIS, UNSPECIFIED TRIGGER: Primary | ICD-10-CM

## 2022-05-02 PROCEDURE — 99284 EMERGENCY DEPT VISIT MOD MDM: CPT

## 2022-05-02 RX ORDER — DIPHENHYDRAMINE HCL 25 MG
25 CAPSULE ORAL EVERY 4 HOURS PRN
Qty: 30 CAPSULE | Refills: 0 | Status: SHIPPED | OUTPATIENT
Start: 2022-05-02 | End: 2022-05-12

## 2022-05-02 RX ORDER — ACETAMINOPHEN, ASPIRIN AND CAFFEINE 250; 250; 65 MG/1; MG/1; MG/1
1 TABLET, FILM COATED ORAL EVERY 6 HOURS PRN
COMMUNITY

## 2022-05-02 RX ORDER — TRIAMCINOLONE ACETONIDE 0.25 MG/G
CREAM TOPICAL
Qty: 80 G | Refills: 0 | Status: SHIPPED | OUTPATIENT
Start: 2022-05-02 | End: 2022-05-17

## 2022-05-02 RX ORDER — PREDNISONE 10 MG/1
TABLET ORAL
Qty: 42 TABLET | Refills: 0 | Status: SHIPPED | OUTPATIENT
Start: 2022-05-02 | End: 2022-05-17

## 2022-05-02 ASSESSMENT — PAIN DESCRIPTION - DESCRIPTORS: DESCRIPTORS: ITCHING

## 2022-05-02 ASSESSMENT — PAIN DESCRIPTION - FREQUENCY: FREQUENCY: CONTINUOUS

## 2022-05-02 ASSESSMENT — PAIN DESCRIPTION - LOCATION: LOCATION: ARM;LEG;ABDOMEN

## 2022-05-02 ASSESSMENT — PAIN SCALES - GENERAL: PAINLEVEL_OUTOF10: 10

## 2022-05-02 ASSESSMENT — PAIN - FUNCTIONAL ASSESSMENT: PAIN_FUNCTIONAL_ASSESSMENT: 0-10

## 2022-05-02 NOTE — ED PROVIDER NOTES
Emergency Department Encounter  Location: [facility] [address] [place] [address]    Patient: Woodard Riedel  MRN: [id number]  : 1995  Date of evaluation: 2022  ED Provider: Yordy Brown DO, FACEP    Chief Complaint:    Rash (Pt arrives ambulatory stating one week ago began to have rash on bilateral arms, legs and abd. Pt states she picked up her nephew 2 days prior that had a rash but family state was eczema. Pt then started at [facility] and the dish soap was irritating skin so unsure why)    [place]:  Woodard Riedel is a 27 y.o. female that presents to the emergency department with complaints of an itchy rash that started on her left forearm and is spread to her other forearm and is on her left abdomen as well as her right leg. The patient states this started about 6 days ago. She states it is itchy and she is using topical itch cream with no relief. She has had this before related to poison ivy or another contact dermatitis. She states she did touch her nephew prior to the age starting and she thinks she may have picked something up from him. She denies other associated signs and symptoms. She has the rash that is itchy present on her forearms bilaterally on her right leg and on her left lower abdomen. ROS:  At least 4 systems reviewed and otherwise acutely negative except as in the [address] Negative for fever or chills  Negative for chest pain  Negative for shortness of breath  Negative for nausea vomiting diarrhea or constipation    Past Medical History:   Diagnosis Date    ADHD (attention deficit hyperactivity disorder)     Mild preeclampsia delivered 1/10/2017     History reviewed. No pertinent surgical history.   Family History   Problem Relation Age of Onset    Cancer Mother     Heart Disease Mother     High Blood Pressure Father      Social History     Socioeconomic History    Marital status: Single     Spouse name: Not on file    Number of children: Not on file    Years of education: Not on file    Highest education level: Not on file   Occupational History    Not on file   Tobacco Use    Smoking status: Current Every Day Smoker     Packs/day: 0.50     Years: 4.00     Pack years: 2.00     Types: Cigarettes    Smokeless tobacco: Former User   Vaping Use    Vaping Use: Former    Substances: Always   Substance and Sexual Activity    Alcohol use: No    Drug use: Yes     Frequency: 2.0 times per week     Types: Marijuana Hassel [place])    Sexual activity: Yes     Partners: Female   Other Topics Concern    Not on file   Social History Narrative    Not on file     Social Determinants of Health     Financial Resource Strain:     Difficulty of Paying Living Expenses: Not on file   Food Insecurity:     Worried About Running Out of Food in the Last Year: Not on file    Ross of Food in the Last Year: Not on file   Transportation Needs:     Lack of Transportation (Medical): Not on file    Lack of Transportation (Non-Medical): Not on file   Physical Activity:     Days of Exercise per Week: Not on file    Minutes of Exercise per Session: Not on file   Stress:     Feeling of Stress : Not on file   Social Connections:     Frequency of Communication with Friends and Family: Not on file    Frequency of Social Gatherings with Friends and Family: Not on file    Attends Latter-day Services: Not on file    Active Member of [address] [place] [address] or Organizations: Not on file    Attends Club or Organization Meetings: Not on file    Marital Status: Not on file   Intimate Partner Violence:     Fear of Current or Ex-Partner: Not on file    Emotionally Abused: Not on file    Physically Abused: Not on file    Sexually Abused: Not on file   Housing Stability:     Unable to Pay for Housing in the Last Year: Not on file    Number of [place] in the Last Year: Not on file    Unstable Housing in the Last Year: Not on file     No current facility-administered medications for this encounter.      Current Outpatient Medications   Medication Sig Dispense Refill    aspirin-acetaminophen-caffeine (EXCEDRIN MIGRAINE) 250-250-65 MG per tablet Take 1 tablet by mouth every 6 hours as needed for Headaches      predniSONE (DELTASONE) 10 MG tablet Take 6 tabs by mouth for 2 days, then take 5 tabs by mouth for 2 days, then take 4 tabs by mouth for 2 days, then take 3 tabs by mouth for 2 days, then take 2 tabs by mouth for 2 days, then take 1 tab by mouth for 2 days. 42 tablet 0    diphenhydrAMINE (BENADRYL) 25 MG capsule Take 1 capsule by mouth every 4 hours as needed for Itching 30 capsule 0    triamcinolone (KENALOG) 0.025 % cream Apply Topically 80 g 0     No Known Allergies  Nursing Notes Reviewed    Physical Exam:  ED Triage Vitals   Enc Vitals Group      BP 05/02/22 1605 107/70      Pulse 05/02/22 1605 72      Resp 05/02/22 1605 16      Temp 05/02/22 1605 97.6 °F (36.4 °C)      Temp Source 05/02/22 1605 Oral      SpO2 05/02/22 1605 97 %      Weight 05/02/22 1600 117 lb (53.1 kg)      Height 05/02/22 1600 5' 3\" (1.6 m)      Head Circumference --       Peak Flow --       Pain Score --       Pain Loc --       Pain Edu? --       Excl. in [address]? --      GENERAL APPEARANCE: Awake and alert. Cooperative. No acute distress. Nontoxic in appearance  HEAD: Normocephalic. Atraumatic. EYES: Sclera anicteric. ENT: Tolerates saliva. NECK: Supple. Trachea midline. LUNGS: Respirations unlabored. EXTREMITIES: No acute deformities. SKIN: Warm and dry. Skin has a dermatitic rash that is present on her forearms bilaterally. It is present on her right calf as well as her left abdomen. She has some redness behind her right ear and also behind her left ear. It is not on her face or neck. NEUROLOGICAL: No gross facial drooping. PSYCHIATRIC: Normal mood. Labs:  No results found for this visit on 05/02/22.     Radiographs (if obtained):  [] The following radiograph was interpreted by myself in the absence of a radiologist:  [x] Radiologist's Report reviewed at time of ED visit:  No orders to display       ED Course and MDM:  Patient will be started on Kenalog cream as well as a 12-day taper dose of steroids. She is given a prescription for Benadryl for pain. Recommend that she use Caladryl as well. She will be discharged in stable condition and referred to the [facility] medicine for clinic. The patient is discharged in stable condition at this time. Final Impression:  1. Irritant contact dermatitis, unspecified trigger      DISPOSITION Decision To Discharge    Patient referred to:  [place] [facility]. [facility] [address]  [phone]  Schedule an appointment as soon as possible for a visit in 1 week  For follow up, to establish primary care    Discharge medications:  New Prescriptions    DIPHENHYDRAMINE (BENADRYL) 25 MG CAPSULE    Take 1 capsule by mouth every 4 hours as needed for Itching    PREDNISONE (DELTASONE) 10 MG TABLET    Take 6 tabs by mouth for 2 days, then take 5 tabs by mouth for 2 days, then take 4 tabs by mouth for 2 days, then take 3 tabs by mouth for 2 days, then take 2 tabs by mouth for 2 days, then take 1 tab by mouth for 2 days.     TRIAMCINOLONE (KENALOG) 0.025 % CREAM    Apply Topically     (Please note that portions of this note may have been completed with a voice recognition program. Efforts were made to edit the dictations but occasionally words are mis-transcribed.)    Shlomo Ramires DO, [facility]  Board certified in [address] [place] [address] AT [place], [place]  05/02/22 0385

## 2022-05-09 ENCOUNTER — HOSPITAL ENCOUNTER (EMERGENCY)
Age: 27
Discharge: HOME | End: 2022-05-09
Attending: EMERGENCY MEDICINE
Payer: COMMERCIAL

## 2022-05-09 VITALS
HEART RATE: 71 BPM | SYSTOLIC BLOOD PRESSURE: 130 MMHG | DIASTOLIC BLOOD PRESSURE: 78 MMHG | OXYGEN SATURATION: 98 % | TEMPERATURE: 98.7 F | RESPIRATION RATE: 20 BRPM

## 2022-05-09 DIAGNOSIS — S61.213A LACERATION OF LEFT MIDDLE FINGER WITHOUT FOREIGN BODY WITHOUT DAMAGE TO NAIL, INITIAL ENCOUNTER: Primary | ICD-10-CM

## 2022-05-09 PROCEDURE — 99282 EMERGENCY DEPT VISIT SF MDM: CPT

## 2022-05-09 PROCEDURE — 12002 RPR S/N/AX/GEN/TRNK2.6-7.5CM: CPT

## 2022-05-09 ASSESSMENT — PAIN DESCRIPTION - LOCATION: LOCATION: FINGER (COMMENT WHICH ONE);HEAD

## 2022-05-09 ASSESSMENT — PAIN SCALES - GENERAL: PAINLEVEL_OUTOF10: 10

## 2022-05-09 ASSESSMENT — PAIN DESCRIPTION - ORIENTATION: ORIENTATION: LEFT

## 2022-05-09 ASSESSMENT — PAIN - FUNCTIONAL ASSESSMENT: PAIN_FUNCTIONAL_ASSESSMENT: 0-10

## 2022-05-09 NOTE — ED PROVIDER NOTES
CHIEF COMPLAINT  Chief Complaint   Patient presents with    Laceration     Left 3rd (middle) finger laceration       HPI  Inna Knott is a 27 y.o. female with history of ADHD who presents laceration left middle finger overlying middle phalanx just proximal to the DIP. She was attempting to open frozen hotdogs and the knife slipped, pierced her directly with a knife tip into the finger. No numbness or weakness, has burning, well localized pain at the small area of laceration. Laceration not gaping, minimal gaping on tension. Less than 0.5 cm. Bleeding controlled at time of arrival here.   No interventions prior to arrival.  Signs and symptoms started immediately after injury which occurred just prior to arrival.      REVIEW OF SYSTEMS  Review of Systems   History obtained from chart review and the patient  General ROS: negative for - chills or fever    Hematological and Lymphatic ROS: negative for - bleeding problems  Endocrine ROS: negative for - unexpected weight changes  Respiratory ROS: no cough, shortness of breath, or wheezing  Cardiovascular ROS: no chest pain or dyspnea on exertion  Gastrointestinal ROS: no abdominal pain, change in bowel habits, or black or bloody stools  Genito-Urinary ROS: no dysuria, trouble voiding, or hematuria  Musculoskeletal ROS: positive for -left middle finger pain, no weakness or numbness  Neurological ROS: negative for - numbness/tingling or weakness      PAST MEDICAL HISTORY  Past Medical History:   Diagnosis Date    ADHD (attention deficit hyperactivity disorder)     Mild preeclampsia delivered 1/10/2017       FAMILY HISTORY  Family History   Problem Relation Age of Onset    Cancer Mother     Heart Disease Mother     High Blood Pressure Father        SOCIAL HISTORY  Social History     Socioeconomic History    Marital status: Single     Spouse name: Not on file    Number of children: Not on file    Years of education: Not on file    Highest education level: Not on file   Occupational History    Not on file   Tobacco Use    Smoking status: Current Every Day Smoker     Packs/day: 0.50     Years: 4.00     Pack years: 2.00     Types: Cigarettes    Smokeless tobacco: Former User   Vaping Use    Vaping Use: Former    Substances: Always   Substance and Sexual Activity    Alcohol use: No    Drug use: Yes     Frequency: 2.0 times per week     Types: Marijuana Rosy Ing)    Sexual activity: Yes     Partners: Female   Other Topics Concern    Not on file   Social History Narrative    Not on file     Social Determinants of Health     Financial Resource Strain:     Difficulty of Paying Living Expenses: Not on file   Food Insecurity:     Worried About Running Out of Food in the Last Year: Not on file    Ross of Food in the Last Year: Not on file   Transportation Needs:     Lack of Transportation (Medical): Not on file    Lack of Transportation (Non-Medical): Not on file   Physical Activity:     Days of Exercise per Week: Not on file    Minutes of Exercise per Session: Not on file   Stress:     Feeling of Stress : Not on file   Social Connections:     Frequency of Communication with Friends and Family: Not on file    Frequency of Social Gatherings with Friends and Family: Not on file    Attends Synagogue Services: Not on file    Active Member of [address] [place] [address] [employer] or Organizations: Not on file    Attends Club or Organization Meetings: Not on file    Marital Status: Not on file   Intimate Partner Violence:     Fear of Current or Ex-Partner: Not on file    Emotionally Abused: Not on file    Physically Abused: Not on file    Sexually Abused: Not on file   Housing Stability:     Unable to Pay for Housing in the Last Year: Not on file    Number of [place] in the Last Year: Not on file    Unstable Housing in the Last Year: Not on file       SURGICAL HISTORY  No past surgical history on file.     CURRENT MEDICATIONS  No current facility-administered medications on file prior to encounter. Current Outpatient Medications on File Prior to Encounter   Medication Sig Dispense Refill    aspirin-acetaminophen-caffeine (EXCEDRIN MIGRAINE) 250-250-65 MG per tablet Take 1 tablet by mouth every 6 hours as needed for Headaches      predniSONE (DELTASONE) 10 MG tablet Take 6 tabs by mouth for 2 days, then take 5 tabs by mouth for 2 days, then take 4 tabs by mouth for 2 days, then take 3 tabs by mouth for 2 days, then take 2 tabs by mouth for 2 days, then take 1 tab by mouth for 2 days. 42 tablet 0    diphenhydrAMINE (BENADRYL) 25 MG capsule Take 1 capsule by mouth every 4 hours as needed for Itching 30 capsule 0    triamcinolone (KENALOG) 0.025 % cream Apply Topically 80 g 0         ALLERGIES  No Known Allergies    PHYSICAL EXAM  VITAL SIGNS: /78   Pulse 71   Temp 98.7 °F (37.1 °C) (Oral)   Resp 20   LMP 04/05/2022   SpO2 98%   Constitutional: Well developed, Well nourished, resting in bed, anxious  HENT: Normocephalic, Atraumatic, Bilateral external ears normal, mask maintained  Eyes:  EOMI, Conjunctiva normal, No discharge. Neck: Normal range of motion, Supple, No stridor. Cardiovascular: Normal heart rate, Normal rhythm   Thorax & Lungs: Normal breath sounds, No respiratory distress, No wheezing, No chest tenderness. Abdomen: No distention  Skin: Warm, Dry, No erythema, No rash. 0.5 linear laceration left middle finger volar middle phalanx region. Bleeding well controlled, under tension minimal gaping. Able to flex full finger. Normal capillary refill distally. Normal station distally. No damage to the nail or palpated foreign body. Extremities: Intact distal pulses, No edema, No tenderness, No cyanosis, No clubbing. Musculoskeletal: Good gross range of motion in all major joints. No major deformities noted. Neurologic: Alert & oriented x 3, Normal gross motor function, Normal gross sensory function, No focal deficits noted.    Psychiatric: Affect normal        RADIOLOGY/PROCEDURES/LABS    Laceration Repair Procedure Note    Indication: Laceration    Procedure: The patient was placed in the appropriate position  The area was then cleansed using alcohol. The laceration was closed with Dermabond. There were no additional lacerations requiring repair. The wound area was then dressed with a bandage. Total repaired wound length: .5  cm. Other Items: None    The patient tolerated the procedure well. Complications: None      COURSE & MEDICAL DECISION MAKING  Pertinent Labs & Imaging studies reviewed. (See chart for details)    19-year-old female presents with small, nongaping laceration left middle finger. Neurovascular intact, no tendon disruption, no foreign body. Shared decision making used as patient is preferring for no suture and only 1 suture would be placed secondary to the size and location. Dermabond applied, bandaging applied. Clean wound, does not warrant antibiotics. Otherwise active, nontoxic. Discharged in stable condition. Strict return precautions put into place. FINAL IMPRESSION  Problem List Items Addressed This Visit     None      Visit Diagnoses     Laceration of left middle finger without foreign body without damage to nail, initial encounter    -  Primary      1.    2.   3.    Patient gave me permission to discuss medical history, care, and plan with those present in the room.   Electronically signed by: Mateo Quiles MD, 5/9/2022  MD Mateo Macias MD  05/09/22 8799

## 2022-05-11 ENCOUNTER — HOSPITAL ENCOUNTER (EMERGENCY)
Age: 27
Discharge: HOME | End: 2022-05-11
Attending: EMERGENCY MEDICINE
Payer: COMMERCIAL

## 2022-05-11 VITALS
RESPIRATION RATE: 16 BRPM | WEIGHT: 117 LBS | HEART RATE: 69 BPM | TEMPERATURE: 98.1 F | BODY MASS INDEX: 20.73 KG/M2 | DIASTOLIC BLOOD PRESSURE: 79 MMHG | HEIGHT: 63 IN | OXYGEN SATURATION: 99 % | SYSTOLIC BLOOD PRESSURE: 116 MMHG

## 2022-05-11 DIAGNOSIS — Z51.89 VISIT FOR WOUND CHECK: Primary | ICD-10-CM

## 2022-05-11 PROCEDURE — 99283 EMERGENCY DEPT VISIT LOW MDM: CPT

## 2022-05-11 RX ORDER — GINSENG 100 MG
CAPSULE ORAL
Qty: 14 G | Refills: 0 | Status: SHIPPED | OUTPATIENT
Start: 2022-05-11 | End: 2022-05-17

## 2022-05-11 RX ORDER — GINSENG 100 MG
CAPSULE ORAL ONCE
Status: DISCONTINUED | OUTPATIENT
Start: 2022-05-11 | End: 2022-05-11 | Stop reason: HOSPADM

## 2022-05-17 ENCOUNTER — HOSPITAL ENCOUNTER (EMERGENCY)
Age: 27
Discharge: HOME | End: 2022-05-17
Attending: EMERGENCY MEDICINE
Payer: COMMERCIAL

## 2022-05-17 VITALS
OXYGEN SATURATION: 98 % | HEART RATE: 63 BPM | RESPIRATION RATE: 18 BRPM | BODY MASS INDEX: 20.55 KG/M2 | WEIGHT: 116 LBS | SYSTOLIC BLOOD PRESSURE: 116 MMHG | TEMPERATURE: 97.4 F | DIASTOLIC BLOOD PRESSURE: 59 MMHG

## 2022-05-17 DIAGNOSIS — T30.0 BURN: Primary | ICD-10-CM

## 2022-05-17 DIAGNOSIS — T22.131A SUPERFICIAL BURN OF RIGHT UPPER ARM, INITIAL ENCOUNTER: ICD-10-CM

## 2022-05-17 PROCEDURE — 99282 EMERGENCY DEPT VISIT SF MDM: CPT

## 2022-05-17 ASSESSMENT — ENCOUNTER SYMPTOMS
BURN: 1
GASTROINTESTINAL NEGATIVE: 1
COUGH: 0
EYES NEGATIVE: 1
EYE PAIN: 0
SHORTNESS OF BREATH: 0
TROUBLE SWALLOWING: 0
RESPIRATORY NEGATIVE: 1

## 2022-05-17 ASSESSMENT — PAIN - FUNCTIONAL ASSESSMENT: PAIN_FUNCTIONAL_ASSESSMENT: NONE - DENIES PAIN

## 2022-05-17 NOTE — ED PROVIDER NOTES
The history is provided by the patient. Burn  Burn location:  Shoulder/arm  Shoulder/arm burn location:  R elbow  Burn quality:  Singed hair (mild erythema)  Time since incident:  4 days  Progression:  Improving  Mechanism of burn:  Hot surface  Relieved by:  Nothing  Worsened by:  Nothing  Ineffective treatments:  None tried  Associated symptoms: no cough, no difficulty swallowing, no eye pain, no nasal burns and no shortness of breath    Associated symptoms comment:  Burnt arm on  exhaust. She also fell on this arm 4 week ago and then when she burned arm she has numbness and tingling in arm distal to the burn. Tetanus status:  Up to date      Review of Systems   Constitutional: Negative. HENT: Negative. Negative for trouble swallowing. Eyes: Negative. Negative for pain. Respiratory: Negative. Negative for cough and shortness of breath. Cardiovascular: Negative. Gastrointestinal: Negative. Genitourinary: Negative. Musculoskeletal: Negative. Skin: Negative. Neurological: Negative. All other systems reviewed and are negative.       Family History   Problem Relation Age of Onset    Cancer Mother     Heart Disease Mother     High Blood Pressure Father      Social History     Socioeconomic History    Marital status: Single     Spouse name: Not on file    Number of children: Not on file    Years of education: Not on file    Highest education level: Not on file   Occupational History    Not on file   Tobacco Use    Smoking status: Current Every Day Smoker     Packs/day: 0.50     Years: 4.00     Pack years: 2.00     Types: Cigarettes    Smokeless tobacco: Former User   Vaping Use    Vaping Use: Former    Substances: Always   Substance and Sexual Activity    Alcohol use: No    Drug use: Yes     Frequency: 2.0 times per week     Types: Marijuana Elelashelley Grubbs)    Sexual activity: Yes     Partners: Female   Other Topics Concern    Not on file   Social History Narrative    Not on file     Social Determinants of Health     Financial Resource Strain:     Difficulty of Paying Living Expenses: Not on file   Food Insecurity:     Worried About Running Out of Food in the Last Year: Not on file    Ross of Food in the Last Year: Not on file   Transportation Needs:     Lack of Transportation (Medical): Not on file    Lack of Transportation (Non-Medical): Not on file   Physical Activity:     Days of Exercise per Week: Not on file    Minutes of Exercise per Session: Not on file   Stress:     Feeling of Stress : Not on file   Social Connections:     Frequency of Communication with Friends and Family: Not on file    Frequency of Social Gatherings with Friends and Family: Not on file    Attends Church Services: Not on file    Active Member of [address] [place] [address] [employer] or Organizations: Not on file    Attends Club or Organization Meetings: Not on file    Marital Status: Not on file   Intimate Partner Violence:     Fear of Current or Ex-Partner: Not on file    Emotionally Abused: Not on file    Physically Abused: Not on file    Sexually Abused: Not on file   Housing Stability:     Unable to Pay for Housing in the Last Year: Not on file    Number of [place] in the Last Year: Not on file    Unstable Housing in the Last Year: Not on file     History reviewed. No pertinent surgical history. Past Medical History:   Diagnosis Date    ADHD (attention deficit hyperactivity disorder)     Mild preeclampsia delivered 1/10/2017     No Known Allergies  Prior to Admission medications    Medication Sig Start Date End Date Taking? Authorizing Provider   aspirin-acetaminophen-caffeine (EXCEDRIN MIGRAINE) 900-385-33 MG per tablet Take 1 tablet by mouth every 6 hours as needed for Headaches    Historical Provider, MD       BP (!) 116/59   Pulse 63   Temp 97.4 °F (36.3 °C) (Oral)   Resp 18   Wt 116 lb (52.6 kg)   SpO2 98%   BMI 20.55 kg/m²     Physical Exam  Vitals and nursing note reviewed. Constitutional:       Appearance: She is well-developed. HENT:      Head: Normocephalic and atraumatic. Right Ear: External ear normal.      Left Ear: External ear normal.      Nose: Nose normal.   Eyes:      Conjunctiva/sclera: Conjunctivae normal.      Pupils: Pupils are equal, round, and reactive to light. Cardiovascular:      Rate and Rhythm: Normal rate and regular rhythm. Heart sounds: Normal heart sounds. Pulmonary:      Effort: Pulmonary effort is normal.      Breath sounds: Normal breath sounds. Abdominal:      General: Bowel sounds are normal.      Palpations: Abdomen is soft. Musculoskeletal:         General: Normal range of motion. Cervical back: Normal range of motion and neck supple. Skin:     General: Skin is warm and dry. Comments: Healing first degree burn over the lateral aspect of the right arm near origin of brachioradialis. It is 3 cm long and 2 cm wide. No bulla and very mild erythema from the burn. This is superificial healing first degree burn   Neurological:      Mental Status: She is alert and oriented to person, place, and time. GCS: GCS eye subscore is 4. GCS verbal subscore is 5. GCS motor subscore is 6. Psychiatric:         Behavior: Behavior normal.         Thought Content: Thought content normal.         Judgment: Judgment normal.         MDM:    Labs Reviewed - No data to display    No orders to display        Superficial burn from 4 days ago. Superficial nerve damage should return. Follow up outpatient there is no need for emergent referral or work up  I have discussed with the patient  my clinical impression and the result of the patient's current clinical evaluation for their presentation. In addition we discussed the risk and benefits of further testing and hospitalization. I discussed candidly with the patient  and the patient  was allowed to provide input as to their thoughts concerning the current presentation.   Although the risk of progression or development of new more serious signs and symptoms cannot be excluded this is a benign healing wound. My typical dicussion, presentation,and considerations for this patients' chief complaint, diagnosis, and differential diagnosis have been considered. I have stressed need for follow up and reexamination for this encounter. I have discussed my clinical impression and the results of the current evaluation. Patient told to buy some ointment such as lidocaine or triple antibiotic. This is not infected and basic wound care discussed    Final Impression    1. Burn    2.  Superficial burn of right upper arm, initial encounter              Evens Resendiz DO  05/17/22 1924

## 2022-06-24 ENCOUNTER — APPOINTMENT (OUTPATIENT)
Dept: GENERAL RADIOLOGY | Age: 27
End: 2022-06-24
Payer: COMMERCIAL

## 2022-06-24 ENCOUNTER — HOSPITAL ENCOUNTER (EMERGENCY)
Age: 27
Discharge: HOME | End: 2022-06-24
Attending: EMERGENCY MEDICINE
Payer: COMMERCIAL

## 2022-06-24 VITALS
BODY MASS INDEX: 20.55 KG/M2 | RESPIRATION RATE: 18 BRPM | TEMPERATURE: 98.4 F | OXYGEN SATURATION: 99 % | HEART RATE: 98 BPM | WEIGHT: 116 LBS | HEIGHT: 63 IN | DIASTOLIC BLOOD PRESSURE: 64 MMHG | SYSTOLIC BLOOD PRESSURE: 118 MMHG

## 2022-06-24 DIAGNOSIS — M79.673 PAIN OF FOOT, UNSPECIFIED LATERALITY: ICD-10-CM

## 2022-06-24 DIAGNOSIS — S90.30XA CONTUSION OF FOOT, UNSPECIFIED LATERALITY, INITIAL ENCOUNTER: Primary | ICD-10-CM

## 2022-06-24 PROCEDURE — 99283 EMERGENCY DEPT VISIT LOW MDM: CPT

## 2022-06-24 PROCEDURE — 73630 X-RAY EXAM OF FOOT: CPT

## 2022-06-24 PROCEDURE — 6370000000 HC RX 637 (ALT 250 FOR IP): Performed by: EMERGENCY MEDICINE

## 2022-06-24 RX ORDER — NAPROXEN 500 MG/1
500 TABLET ORAL 2 TIMES DAILY PRN
Qty: 20 TABLET | Refills: 0 | Status: SHIPPED | OUTPATIENT
Start: 2022-06-24 | End: 2022-07-04

## 2022-06-24 RX ORDER — NAPROXEN 500 MG/1
500 TABLET ORAL ONCE
Status: COMPLETED | OUTPATIENT
Start: 2022-06-24 | End: 2022-06-24

## 2022-06-24 RX ORDER — HYDROCODONE BITARTRATE AND ACETAMINOPHEN 5; 325 MG/1; MG/1
1 TABLET ORAL ONCE
Status: COMPLETED | OUTPATIENT
Start: 2022-06-24 | End: 2022-06-24

## 2022-06-24 RX ORDER — HYDROCODONE BITARTRATE AND ACETAMINOPHEN 5; 325 MG/1; MG/1
1-2 TABLET ORAL EVERY 8 HOURS PRN
Qty: 9 TABLET | Refills: 0 | Status: SHIPPED | OUTPATIENT
Start: 2022-06-24 | End: 2022-06-27

## 2022-06-24 RX ADMIN — NAPROXEN 500 MG: 500 TABLET ORAL at 21:03

## 2022-06-24 RX ADMIN — HYDROCODONE BITARTRATE AND ACETAMINOPHEN 1 TABLET: 5; 325 TABLET ORAL at 21:03

## 2022-06-24 ASSESSMENT — PAIN SCALES - GENERAL
PAINLEVEL_OUTOF10: 9
PAINLEVEL_OUTOF10: 8

## 2022-06-24 ASSESSMENT — ENCOUNTER SYMPTOMS
RESPIRATORY NEGATIVE: 1
EYES NEGATIVE: 1
GASTROINTESTINAL NEGATIVE: 1

## 2022-06-24 ASSESSMENT — PAIN - FUNCTIONAL ASSESSMENT: PAIN_FUNCTIONAL_ASSESSMENT: 0-10

## 2022-06-25 NOTE — ED PROVIDER NOTES
The history is provided by the patient. Foot Problem  Location:  Foot  Injury: yes    Mechanism of injury comment:  Kicked a boxspring on accident. Some swelling noted to right foot, can't move 3rd,4th, nd 5th toe. Foot location:  R foot  Pain details:     Quality:  Throbbing    Severity:  Severe    Onset quality:  Sudden  Chronicity:  New  Dislocation: no    Foreign body present:  No foreign bodies  Tetanus status:  Up to date  Prior injury to area:  Yes  Relieved by:  Nothing  Worsened by:  Nothing  Ineffective treatments:  None tried  Associated symptoms: decreased ROM, stiffness and swelling        Review of Systems   Constitutional: Negative. HENT: Negative. Eyes: Negative. Respiratory: Negative. Cardiovascular: Negative. Gastrointestinal: Negative. Genitourinary: Negative. Musculoskeletal: Positive for stiffness. Skin: Negative. Neurological: Negative. All other systems reviewed and are negative.       Family History   Problem Relation Age of Onset    Cancer Mother     Heart Disease Mother     High Blood Pressure Father      Social History     Socioeconomic History    Marital status: Single     Spouse name: Not on file    Number of children: Not on file    Years of education: Not on file    Highest education level: Not on file   Occupational History    Not on file   Tobacco Use    Smoking status: Current Every Day Smoker     Packs/day: 0.50     Years: 4.00     Pack years: 2.00     Types: Cigarettes    Smokeless tobacco: Former User   Vaping Use    Vaping Use: Former    Substances: Always   Substance and Sexual Activity    Alcohol use: No    Drug use: Yes     Frequency: 2.0 times per week     Types: Marijuana Darliss Meeter)    Sexual activity: Yes     Partners: Female   Other Topics Concern    Not on file   Social History Narrative    Not on file     Social Determinants of Health     Financial Resource Strain:     Difficulty of Paying Living Expenses: Not on file   Food Insecurity:     Worried About Running Out of Food in the Last Year: Not on file    Ross of Food in the Last Year: Not on file   Transportation Needs:     Lack of Transportation (Medical): Not on file    Lack of Transportation (Non-Medical): Not on file   Physical Activity:     Days of Exercise per Week: Not on file    Minutes of Exercise per Session: Not on file   Stress:     Feeling of Stress : Not on file   Social Connections:     Frequency of Communication with Friends and Family: Not on file    Frequency of Social Gatherings with Friends and Family: Not on file    Attends [employer] Services: Not on file    Active Member of [address] [place] [address] [employer] or Organizations: Not on file    Attends Club or Organization Meetings: Not on file    Marital Status: Not on file   Intimate Partner Violence:     Fear of Current or Ex-Partner: Not on file    Emotionally Abused: Not on file    Physically Abused: Not on file    Sexually Abused: Not on file   Housing Stability:     Unable to Pay for Housing in the Last Year: Not on file    Number of [place] in the Last Year: Not on file    Unstable Housing in the Last Year: Not on file     History reviewed. No pertinent surgical history. Past Medical History:   Diagnosis Date    ADHD (attention deficit hyperactivity disorder)     Mild preeclampsia delivered 1/10/2017     No Known Allergies  Prior to Admission medications    Medication Sig Start Date End Date Taking? Authorizing Provider   naproxen (NAPROSYN) 500 MG tablet Take 1 tablet by mouth 2 times daily as needed for Pain 6/24/22 7/4/22 Yes Flo Covarrubias DO   HYDROcodone-acetaminophen [facility]) 5-325 MG per tablet Take 1-2 tablets by mouth every 8 hours as needed for Pain for up to 3 days.  6/24/22 6/27/22 Yes lFo Covarrubias DO   aspirin-acetaminophen-caffeine (EXCEDRIN MIGRAINE) 054-076-20 MG per tablet Take 1 tablet by mouth every 6 hours as needed for Headaches    Historical Provider, MD       /64 Pulse 98   Temp 98.4 °F (36.9 °C) (Oral)   Resp 18   Ht 5' 3\" (1.6 m)   Wt 116 lb (52.6 kg)   SpO2 99%   BMI 20.55 kg/m²     Physical Exam  Vitals and nursing note reviewed. Constitutional:       Appearance: She is well-developed. HENT:      Head: Normocephalic and atraumatic. Right Ear: External ear normal.      Left Ear: External ear normal.      Nose: Nose normal.   Eyes:      Conjunctiva/sclera: Conjunctivae normal.      Pupils: Pupils are equal, round, and reactive to light. Cardiovascular:      Rate and Rhythm: Normal rate and regular rhythm. Heart sounds: Normal heart sounds. Pulmonary:      Effort: Pulmonary effort is normal.      Breath sounds: Normal breath sounds. Abdominal:      General: Bowel sounds are normal.      Palpations: Abdomen is soft. Musculoskeletal:      Cervical back: Normal range of motion and neck supple. Right foot: Decreased range of motion. Swelling, tenderness and bony tenderness present. No deformity. Skin:     General: Skin is warm and dry. Neurological:      Mental Status: She is alert and oriented to person, place, and time. GCS: GCS eye subscore is 4. GCS verbal subscore is 5. GCS motor subscore is 6. Psychiatric:         Behavior: Behavior normal.         Thought Content: Thought content normal.         Judgment: Judgment normal.         MDM:    Labs Reviewed - No data to display    XR FOOT RIGHT (MIN 3 VIEWS)   Final Result   No acute osseous abnormality. katherine SCHROEDER  My typical dicussion, presentation, and considerations for this patients' chief complaint, diagnosis, differential diagnosis, medications, medication use,  medication safety and medication interactions have been explained and outlined to this patient for this patient encounter. I have stressed need for follow up and reexamination for this encounter         Final Impression    1. Contusion of foot, unspecified laterality, initial encounter    2.  Pain of foot, unspecified laterality              Caleb Sender, DO  06/24/22 3848

## 2022-06-25 NOTE — ED NOTES
Pt discharged with instructions and prescriptions. Discussed when and how to take medications and pt stated understanding.   Pt using crutches out of the [id number], RN  06/24/22 2434

## 2022-11-03 ENCOUNTER — HOSPITAL ENCOUNTER (EMERGENCY)
Age: 27
Discharge: HOME | End: 2022-11-03
Attending: EMERGENCY MEDICINE
Payer: COMMERCIAL

## 2022-11-03 VITALS
OXYGEN SATURATION: 97 % | WEIGHT: 117 LBS | DIASTOLIC BLOOD PRESSURE: 79 MMHG | RESPIRATION RATE: 20 BRPM | HEART RATE: 72 BPM | BODY MASS INDEX: 21.53 KG/M2 | SYSTOLIC BLOOD PRESSURE: 121 MMHG | TEMPERATURE: 98.4 F | HEIGHT: 62 IN

## 2022-11-03 DIAGNOSIS — K04.7 DENTAL INFECTION: ICD-10-CM

## 2022-11-03 DIAGNOSIS — K08.89 PAIN, DENTAL: Primary | ICD-10-CM

## 2022-11-03 PROCEDURE — 6370000000 HC RX 637 (ALT 250 FOR IP): Performed by: EMERGENCY MEDICINE

## 2022-11-03 PROCEDURE — 99283 EMERGENCY DEPT VISIT LOW MDM: CPT

## 2022-11-03 RX ORDER — HYDROCODONE BITARTRATE AND ACETAMINOPHEN 5; 325 MG/1; MG/1
1 TABLET ORAL ONCE
Status: COMPLETED | OUTPATIENT
Start: 2022-11-03 | End: 2022-11-03

## 2022-11-03 RX ORDER — NAPROXEN 500 MG/1
500 TABLET ORAL ONCE
Status: DISCONTINUED | OUTPATIENT
Start: 2022-11-03 | End: 2022-11-03 | Stop reason: HOSPADM

## 2022-11-03 RX ORDER — PENICILLIN V POTASSIUM 500 MG/1
500 TABLET ORAL 3 TIMES DAILY
Qty: 30 TABLET | Refills: 0 | Status: SHIPPED | OUTPATIENT
Start: 2022-11-03 | End: 2022-11-13

## 2022-11-03 RX ORDER — NAPROXEN 500 MG/1
500 TABLET ORAL 2 TIMES DAILY PRN
Qty: 20 TABLET | Refills: 0 | Status: SHIPPED | OUTPATIENT
Start: 2022-11-03 | End: 2022-11-13

## 2022-11-03 RX ORDER — IBUPROFEN 800 MG/1
800 TABLET ORAL EVERY 6 HOURS PRN
COMMUNITY

## 2022-11-03 RX ORDER — PENICILLIN V POTASSIUM 500 MG/1
500 TABLET ORAL ONCE
Status: COMPLETED | OUTPATIENT
Start: 2022-11-03 | End: 2022-11-03

## 2022-11-03 RX ORDER — ACETAMINOPHEN 500 MG
1000 TABLET ORAL EVERY 6 HOURS PRN
COMMUNITY

## 2022-11-03 RX ORDER — HYDROCODONE BITARTRATE AND ACETAMINOPHEN 5; 325 MG/1; MG/1
1-2 TABLET ORAL EVERY 8 HOURS PRN
Qty: 9 TABLET | Refills: 0 | Status: SHIPPED | OUTPATIENT
Start: 2022-11-03 | End: 2022-11-06

## 2022-11-03 RX ADMIN — PENICILLIN V POTASSIUM 500 MG: 500 TABLET, FILM COATED ORAL at 19:27

## 2022-11-03 RX ADMIN — HYDROCODONE BITARTRATE AND ACETAMINOPHEN 1 TABLET: 5; 325 TABLET ORAL at 19:27

## 2022-11-03 ASSESSMENT — PAIN DESCRIPTION - PAIN TYPE: TYPE: ACUTE PAIN

## 2022-11-03 ASSESSMENT — PAIN DESCRIPTION - LOCATION
LOCATION: TEETH
LOCATION: TEETH

## 2022-11-03 ASSESSMENT — PAIN DESCRIPTION - ONSET: ONSET: SUDDEN

## 2022-11-03 ASSESSMENT — PAIN - FUNCTIONAL ASSESSMENT: PAIN_FUNCTIONAL_ASSESSMENT: 0-10

## 2022-11-03 ASSESSMENT — ENCOUNTER SYMPTOMS
FACIAL SWELLING: 1
GASTROINTESTINAL NEGATIVE: 1
TRISMUS: 0
RESPIRATORY NEGATIVE: 1
EYES NEGATIVE: 1

## 2022-11-03 ASSESSMENT — PAIN SCALES - GENERAL
PAINLEVEL_OUTOF10: 9
PAINLEVEL_OUTOF10: 9

## 2022-11-03 ASSESSMENT — PAIN DESCRIPTION - ORIENTATION: ORIENTATION: LEFT;UPPER

## 2022-11-03 ASSESSMENT — PAIN DESCRIPTION - FREQUENCY: FREQUENCY: CONTINUOUS

## 2022-11-03 NOTE — ED PROVIDER NOTES
The history is provided by the patient. Dental Pain  Location:  Lower  Quality:  Throbbing and pressure-like  Severity:  Moderate  Timing:  Constant  Progression:  Worsening  Context: poor dentition    Previous work-up:  Dental exam  Relieved by:  Nothing  Worsened by:  Nothing  Ineffective treatments:  None tried  Associated symptoms: facial pain and facial swelling    Associated symptoms: no congestion, no difficulty swallowing, no drooling, no fever, no gum swelling, no headaches, no neck pain, no neck swelling, no oral bleeding, no oral lesions and no trismus      Review of Systems   Constitutional: Negative. Negative for fever. HENT:  Positive for facial swelling. Negative for congestion, drooling and mouth sores. Eyes: Negative. Respiratory: Negative. Cardiovascular: Negative. Gastrointestinal: Negative. Genitourinary: Negative. Musculoskeletal: Negative. Negative for neck pain. Skin: Negative. Neurological: Negative. Negative for headaches. All other systems reviewed and are negative.     Family History   Problem Relation Age of Onset    Cancer Mother     Heart Disease Mother     High Blood Pressure Father      Social History     Socioeconomic History    Marital status: Single     Spouse name: Not on file    Number of children: Not on file    Years of education: Not on file    Highest education level: Not on file   Occupational History    Not on file   Tobacco Use    Smoking status: Every Day     Packs/day: 0.50     Years: 4.00     Pack years: 2.00     Types: Cigarettes    Smokeless tobacco: Former   Vaping Use    Vaping Use: Former    Substances: Always   Substance and Sexual Activity    Alcohol use: No    Drug use: Yes     Frequency: 2.0 times per week     Types: Marijuana Alfornia Cashing)    Sexual activity: Yes     Partners: Female   Other Topics Concern    Not on file   Social History Narrative    Not on file     Social Determinants of Health     Financial Resource Strain: Not on file   Food Insecurity: Not on file   Transportation Needs: Not on file   Physical Activity: Not on file   Stress: Not on file   Social Connections: Not on file   Intimate Partner Violence: Not on file   Housing Stability: Not on file     No past surgical history on file. Past Medical History:   Diagnosis Date    ADHD (attention deficit hyperactivity disorder)     Mild preeclampsia delivered 1/10/2017     No Known Allergies  Prior to Admission medications    Medication Sig Start Date End Date Taking? Authorizing Provider   ibuprofen (ADVIL;MOTRIN) 800 MG tablet Take 800 mg by mouth every 6 hours as needed for Pain   Yes Historical Provider, MD   acetaminophen (TYLENOL) 500 MG tablet Take 1,000 mg by mouth every 6 hours as needed for Pain   Yes Historical Provider, MD   naproxen (NAPROSYN) 500 MG tablet Take 1 tablet by mouth 2 times daily as needed for Pain 11/3/22 11/13/22 Yes James Covarrubias DO   penicillin v potassium (VEETID) 500 MG tablet Take 1 tablet by mouth 3 times daily for 10 days 11/3/22 11/13/22 Yes James Covarrubias DO   HYDROcodone-acetaminophen (NORCO) 5-325 MG per tablet Take 1-2 tablets by mouth every 8 hours as needed for Pain for up to 3 days. 11/3/22 11/6/22 Yes James Covarrubias DO   naproxen (NAPROSYN) 500 MG tablet Take 1 tablet by mouth 2 times daily as needed for Pain 6/24/22 7/4/22  James Covarrubias DO   aspirin-acetaminophen-caffeine (EXCEDRIN MIGRAINE) 003-383-44 MG per tablet Take 1 tablet by mouth every 6 hours as needed for Headaches    Historical Provider, MD       /79   Pulse 72   Temp 98.4 °F (36.9 °C) (Oral)   Resp 20   Ht 5' 2\" (1.575 m)   Wt 117 lb (53.1 kg)   SpO2 97%   BMI 21.40 kg/m²     Physical Exam  Vitals and nursing note reviewed. Constitutional:       Appearance: Normal appearance. She is normal weight. HENT:      Mouth/Throat:      Dentition: Dental tenderness, gingival swelling and dental caries present. Pharynx: Oropharynx is clear. Uvula midline. Tonsils: No tonsillar exudate or tonsillar abscesses. Eyes:      Pupils: Pupils are equal, round, and reactive to light. Cardiovascular:      Rate and Rhythm: Normal rate. Pulmonary:      Effort: Pulmonary effort is normal.      Breath sounds: Normal breath sounds. Abdominal:      General: There is no distension. Tenderness: There is no abdominal tenderness. There is no guarding or rebound. Skin:     General: Skin is warm. Capillary Refill: Capillary refill takes less than 2 seconds. Neurological:      General: No focal deficit present. Mental Status: She is alert. MDM:    Labs Reviewed - No data to display    No orders to display                Review of patient's previous medical records which includes laboratory testing, imaging and previous provider's notes was not  necessary for this particular presentation. The patient social factors/detriments to their health care do not play a role in this presentation. In order to provide the best outcome for the patient consultation was not obtained but she was given numerous outpatient clinics for dental. Since Epic is inadequate in providing these numbers I have provided a list from the Internet. Overall, I have discussed with the patient  my clinical impression and the result of the patient's current clinical evaluation for their presentation. In addition we discussed the risk and benefits of further testing and hospitalization. I discussed candidly with the patient  and the patient  was allowed to provide input as to their thoughts concerning the current presentation. As a result the patient will be   In order to best support the patient, they were prescribed vicodin 500/5 by mouth, naprosyn 500mg by mouth and Pen Vk 500mg by mouth for home usage.  The medication(s) use,  medication(s) safety and medication(s) interactions with already prescribed medication(s) have been explained and outlined for this encounter. The patient was educated that it is their responsibility to verify this information is correct at the time of discharge and to contact this department of any complications with the pharmacy providing this medication(s) or if their any difficulty in obtaining this medication(s). Final Impression    1. Pain, dental    2.  Dental infection              287 Makenna Langley DO  11/03/22 1942

## 2023-02-05 ENCOUNTER — HOSPITAL ENCOUNTER (EMERGENCY)
Age: 28
Discharge: HOME | End: 2023-02-05
Attending: EMERGENCY MEDICINE
Payer: COMMERCIAL

## 2023-02-05 VITALS
DIASTOLIC BLOOD PRESSURE: 65 MMHG | TEMPERATURE: 98.3 F | HEIGHT: 62 IN | SYSTOLIC BLOOD PRESSURE: 123 MMHG | OXYGEN SATURATION: 97 % | HEART RATE: 73 BPM | WEIGHT: 114 LBS | RESPIRATION RATE: 18 BRPM | BODY MASS INDEX: 20.98 KG/M2

## 2023-02-05 DIAGNOSIS — J40 BRONCHITIS: Primary | ICD-10-CM

## 2023-02-05 DIAGNOSIS — J02.9 VIRAL PHARYNGITIS: ICD-10-CM

## 2023-02-05 DIAGNOSIS — J06.9 UPPER RESPIRATORY TRACT INFECTION, UNSPECIFIED TYPE: ICD-10-CM

## 2023-02-05 PROCEDURE — 87430 STREP A AG IA: CPT

## 2023-02-05 PROCEDURE — 99283 EMERGENCY DEPT VISIT LOW MDM: CPT | Performed by: EMERGENCY MEDICINE

## 2023-02-05 PROCEDURE — 87081 CULTURE SCREEN ONLY: CPT

## 2023-02-05 PROCEDURE — 6370000000 HC RX 637 (ALT 250 FOR IP): Performed by: EMERGENCY MEDICINE

## 2023-02-05 RX ORDER — PREDNISONE 20 MG/1
60 TABLET ORAL ONCE
Status: COMPLETED | OUTPATIENT
Start: 2023-02-05 | End: 2023-02-05

## 2023-02-05 RX ORDER — PREDNISONE 10 MG/1
40 TABLET ORAL DAILY
Qty: 25 TABLET | Refills: 0 | Status: SHIPPED | OUTPATIENT
Start: 2023-02-05 | End: 2023-02-11

## 2023-02-05 RX ORDER — ACETAMINOPHEN 500 MG
1000 TABLET ORAL
Status: COMPLETED | OUTPATIENT
Start: 2023-02-05 | End: 2023-02-05

## 2023-02-05 RX ORDER — BENZONATATE 100 MG/1
100 CAPSULE ORAL 2 TIMES DAILY PRN
Qty: 14 CAPSULE | Refills: 0 | Status: SHIPPED | OUTPATIENT
Start: 2023-02-05 | End: 2023-02-12

## 2023-02-05 RX ADMIN — PREDNISONE 60 MG: 20 TABLET ORAL at 12:35

## 2023-02-05 RX ADMIN — ACETAMINOPHEN 1000 MG: 500 TABLET ORAL at 12:35

## 2023-02-05 ASSESSMENT — PAIN DESCRIPTION - LOCATION: LOCATION: THROAT

## 2023-02-05 ASSESSMENT — PAIN SCALES - GENERAL: PAINLEVEL_OUTOF10: 3

## 2023-02-05 NOTE — ED NOTES
Discharge instructions reviewed with patient. Reviewed medications with patient. No additional questions asked. Voiced understanding. Encouraged patient to follow up as discussed by the ED physician. Discussed with patient alternating acetaminophen and ibuprofen for pain control. Reviewed taking medications every 6 hours as directed on packages. For example: take acetaminophen then three hours later take ibuprofen then three hours later take acetaminophen then take ibuprofen three hours later. By doing that something is given every three hours for pain reduction and comfort.        Walter Shi RN  02/05/23 [id number]

## 2023-02-05 NOTE — Clinical Note
Jay Nj was seen and treated in our emergency department on 2/5/2023. She may return to work on 02/07/2023. If you have any questions or concerns, please don't hesitate to call.       Adri Meyers MD

## 2023-02-05 NOTE — Clinical Note
Chuck Ordaz was seen and treated in our emergency department on 2/5/2023. She may return to work on 02/07/2023. If you have any questions or concerns, please don't hesitate to call.       Mary Castellanos MD

## 2023-02-05 NOTE — ED PROVIDER NOTES
Triage Chief Complaint:    Pharyngitis (Since this morning and has a cough x 2 weeks) and Cough    HPI   Diane Mackey is a 24 y.o. female that presents for evaluation of cough that is ongoing for about 2 weeks now. Yellow sputum production. Patient did state that she has not had any shortness of breath or chest pain. She does report that she had a sore throat over the last day. She states that she is still to swallow without difficulty. No change to her voice. No fevers or chills. No abdominal pain nausea vomiting diarrhea or constipation. Denies regular alcohol use or illicit drug use but does state that she does smoke tobacco.  She does report that she has had steroids for this in the past which have helped. History from : Patient    Limitations to history : None    ROS:  10 systems reviewed see above for pertinent positives/negatives. ROS otherwise negative    Past Medical History:   Diagnosis Date    ADHD (attention deficit hyperactivity disorder)     Mild preeclampsia delivered 1/10/2017     History reviewed. No pertinent surgical history.   Family History   Problem Relation Age of Onset    Cancer Mother     Heart Disease Mother     High Blood Pressure Father      Social History     Socioeconomic History    Marital status: Single     Spouse name: Not on file    Number of children: Not on file    Years of education: Not on file    Highest education level: Not on file   Occupational History    Not on file   Tobacco Use    Smoking status: Every Day     Packs/day: 0.50     Years: 4.00     Pack years: 2.00     Types: Cigarettes    Smokeless tobacco: Former   Vaping Use    Vaping Use: Former    Substances: Always   Substance and Sexual Activity    Alcohol use: No    Drug use: Yes     Frequency: 2.0 times per week     Types: Marijuana Princella Fergusson)    Sexual activity: Yes     Partners: Female   Other Topics Concern    Not on file   Social History Narrative    Not on file     Social Determinants of Health Financial Resource Strain: Not on file   Food Insecurity: Not on file   Transportation Needs: Not on file   Physical Activity: Not on file   Stress: Not on file   Social Connections: Not on file   Intimate Partner Violence: Not on file   Housing Stability: Not on file     No current facility-administered medications for this encounter. Current Outpatient Medications   Medication Sig Dispense Refill    predniSONE (DELTASONE) 10 MG tablet Take 4 tablets by mouth daily for 6 days 25 tablet 0    loratadine-pseudoephedrine (CLARITIN-D 12HR) 5-120 MG per extended release tablet Take 1 tablet by mouth 2 times daily for 7 days 14 tablet 0    benzonatate (TESSALON) 100 MG capsule Take 1 capsule by mouth 2 times daily as needed for Cough 14 capsule 0    sertraline (ZOLOFT) 50 MG tablet       ibuprofen (ADVIL;MOTRIN) 800 MG tablet Take 800 mg by mouth every 6 hours as needed for Pain      acetaminophen (TYLENOL) 500 MG tablet Take 1,000 mg by mouth every 6 hours as needed for Pain      naproxen (NAPROSYN) 500 MG tablet Take 1 tablet by mouth 2 times daily as needed for Pain 20 tablet 0    naproxen (NAPROSYN) 500 MG tablet Take 1 tablet by mouth 2 times daily as needed for Pain 20 tablet 0    aspirin-acetaminophen-caffeine (EXCEDRIN MIGRAINE) 250-250-65 MG per tablet Take 1 tablet by mouth every 6 hours as needed for Headaches       Allergies   Allergen Reactions    Naprosyn [Naproxen] Nausea And Vomiting       Nursing Notes Reviewed      Physical Exam:     ED Triage Vitals [02/05/23 1143]   Enc Vitals Group      /65      Heart Rate 73      Resp 18      Temp 98.3 °F (36.8 °C)      Temp Source Oral      SpO2 97 %      Weight 114 lb (51.7 kg)      Height 5' 2\" (1.575 m)      Head Circumference       Peak Flow       Pain Score       Pain Loc       Pain Edu? Excl. in [address]? My pulse ox interpretation is - normal    General appearance:  No acute distress. Skin:  Warm. Dry.    Eye:  Extraocular movements intact. Ears, nose, mouth and throat:  Oral mucosa moist, boggy turbinates bilaterally, clear rhinorrhea, oropharynx with slight erythema no tonsillar swelling or exudates. Neck:  Trachea midline. Extremity:  No swelling. Normal ROM     Heart:  Regular rate and rhythm, no murmurs  Perfusion:  intact  Respiratory: Faint expiratory wheeze but good air entry throughout. Respirations nonlabored. Abdominal:  Normal bowel sounds. Soft. Nontender. Non distended. Back:  No CVA tenderness to palpation     Neurological:  Alert and oriented times 3. Motor and sensory grossly intact, coordination intact          Psychiatric:  Appropriate    I have reviewed and interpreted all of the currently available lab results from this visit (if applicable):  Results for orders placed or performed during the hospital encounter of 02/05/23   Strep Screen Group A Throat    Specimen: Throat   Result Value Ref Range    Specimen THROAT     Special Requests NONE     Strep A Direct Screen NEGATIVE       Radiographs (if obtained):  [] The following radiograph was interpreted by myself in the absence of a radiologist:   [] Radiologist's Report Reviewed:  No orders to display         EKG (if obtained): (All EKG's are interpreted by myself in the absence of a cardiologist)    Chart review shows recent radiographs:  No results found. MDM:   Discussion with Other Professionals : None    Social Determinants: None    Records Reviewed : None    Chronic conditions affecting care: None    Labs ordered and results as above (interpreted by myself), strep swab was negative so low suspicion for strep pharyngitis  CXR considered to evaluate for pneumonia, new effusion, and/or pneumothorax, but deferred for now based on clinical judgement/or after discussion with patient/patient's family.       Patient was given the following medications:  Medications   predniSONE (DELTASONE) tablet 60 mg (60 mg Oral Given 2/5/23 1835)   acetaminophen (TYLENOL) tablet 1,000 mg (1,000 mg Oral Given 2/5/23 1235)       This patient is coming today for what appears to be URI and possibly viral bronchitis. The patient has stable vitals here is tolerating oral intake and does not appear to be in any respiratory distress. Plan is for discharge home with close follow up. In the differential for this patient includes sinusitis, pharyngitis, retropharyngeal abscess, peritonsillar abscess, epiglottitis, Joe's angina, pneumonia. I have instructed this patient that although not present at this time could develop in the future. The patient is instructed to follow with their primary care doctor and in the meantime will be given supportive care measures. The patient was agreeable with this plan of care and expressed understanding of the discharge instructions. Disposition: Discharged    Clinical Impression:  1. Bronchitis    2. Viral pharyngitis    3. Upper respiratory tract infection, unspecified type      Disposition referral (if applicable):  Maxime Chapa MD  [address]  [phone]    Schedule an appointment as soon as possible for a visit       [facility] Emergency Department  [address]  [phone]    If symptoms worsen    Disposition medications (if applicable):  New Prescriptions    BENZONATATE (TESSALON) 100 MG CAPSULE    Take 1 capsule by mouth 2 times daily as needed for Cough    LORATADINE-PSEUDOEPHEDRINE (CLARITIN-D 12HR) 5-120 MG PER EXTENDED RELEASE TABLET    Take 1 tablet by mouth 2 times daily for 7 days    PREDNISONE (DELTASONE) 10 MG TABLET    Take 4 tablets by mouth daily for 6 days       Comment: Please note this report has been produced using speech recognition software and may contain errors related to that system including errors in grammar, punctuation, and spelling, as well as words and phrases that may be inappropriate.  If there are any questions or concerns please feel free to contact the dictating provider for clarification.          Bekah Overton MD  02/05/23 7869

## 2023-02-08 LAB
CULTURE: NORMAL
Lab: NORMAL
SPECIMEN: NORMAL
STREP A DIRECT SCREEN: NEGATIVE

## 2023-06-29 ENCOUNTER — APPOINTMENT (OUTPATIENT)
Dept: GENERAL RADIOLOGY | Age: 28
End: 2023-06-29
Payer: COMMERCIAL

## 2023-06-29 ENCOUNTER — HOSPITAL ENCOUNTER (EMERGENCY)
Age: 28
Discharge: HOME | End: 2023-06-29
Attending: EMERGENCY MEDICINE
Payer: COMMERCIAL

## 2023-06-29 VITALS
WEIGHT: 117 LBS | TEMPERATURE: 98.1 F | RESPIRATION RATE: 16 BRPM | HEART RATE: 79 BPM | DIASTOLIC BLOOD PRESSURE: 81 MMHG | OXYGEN SATURATION: 97 % | BODY MASS INDEX: 21.4 KG/M2 | SYSTOLIC BLOOD PRESSURE: 125 MMHG

## 2023-06-29 DIAGNOSIS — M62.838 SPASM OF MUSCLE: ICD-10-CM

## 2023-06-29 DIAGNOSIS — S39.012A STRAIN OF LUMBAR REGION, INITIAL ENCOUNTER: Primary | ICD-10-CM

## 2023-06-29 PROCEDURE — 6360000002 HC RX W HCPCS: Performed by: EMERGENCY MEDICINE

## 2023-06-29 PROCEDURE — 6370000000 HC RX 637 (ALT 250 FOR IP): Performed by: EMERGENCY MEDICINE

## 2023-06-29 PROCEDURE — 99284 EMERGENCY DEPT VISIT MOD MDM: CPT

## 2023-06-29 PROCEDURE — 72100 X-RAY EXAM L-S SPINE 2/3 VWS: CPT

## 2023-06-29 PROCEDURE — 96372 THER/PROPH/DIAG INJ SC/IM: CPT

## 2023-06-29 PROCEDURE — 72072 X-RAY EXAM THORAC SPINE 3VWS: CPT

## 2023-06-29 RX ORDER — ORPHENADRINE CITRATE 30 MG/ML
60 INJECTION INTRAMUSCULAR; INTRAVENOUS ONCE
Status: COMPLETED | OUTPATIENT
Start: 2023-06-29 | End: 2023-06-29

## 2023-06-29 RX ORDER — METHYLPREDNISOLONE 4 MG/1
TABLET ORAL
Qty: 1 KIT | Refills: 0 | Status: SHIPPED | OUTPATIENT
Start: 2023-06-29

## 2023-06-29 RX ORDER — LIDOCAINE 50 MG/G
1 PATCH TOPICAL DAILY
Qty: 30 PATCH | Refills: 0 | Status: SHIPPED | OUTPATIENT
Start: 2023-06-29 | End: 2023-07-29

## 2023-06-29 RX ORDER — CYCLOBENZAPRINE HCL 10 MG
10 TABLET ORAL 3 TIMES DAILY PRN
Qty: 30 TABLET | Refills: 0 | Status: SHIPPED | OUTPATIENT
Start: 2023-06-29 | End: 2023-07-09

## 2023-06-29 RX ORDER — LIDOCAINE 4 G/G
1 PATCH TOPICAL ONCE
Status: DISCONTINUED | OUTPATIENT
Start: 2023-06-29 | End: 2023-06-29 | Stop reason: HOSPADM

## 2023-06-29 RX ORDER — KETOROLAC TROMETHAMINE 30 MG/ML
30 INJECTION, SOLUTION INTRAMUSCULAR; INTRAVENOUS ONCE
Status: COMPLETED | OUTPATIENT
Start: 2023-06-29 | End: 2023-06-29

## 2023-06-29 RX ADMIN — KETOROLAC TROMETHAMINE 30 MG: 30 INJECTION, SOLUTION INTRAMUSCULAR; INTRAVENOUS at 01:46

## 2023-06-29 RX ADMIN — ORPHENADRINE CITRATE 60 MG: 60 INJECTION INTRAMUSCULAR; INTRAVENOUS at 01:46

## 2023-06-29 ASSESSMENT — PAIN DESCRIPTION - LOCATION: LOCATION: BACK

## 2023-06-29 ASSESSMENT — PAIN SCALES - GENERAL: PAINLEVEL_OUTOF10: 8

## 2023-06-29 ASSESSMENT — PAIN DESCRIPTION - ORIENTATION: ORIENTATION: RIGHT;MID

## 2023-06-29 ASSESSMENT — PAIN - FUNCTIONAL ASSESSMENT: PAIN_FUNCTIONAL_ASSESSMENT: 0-10

## 2023-09-02 ENCOUNTER — HOSPITAL ENCOUNTER (EMERGENCY)
Age: 28
Discharge: HOME | End: 2023-09-02

## 2023-09-02 VITALS
OXYGEN SATURATION: 98 % | OXYGEN SATURATION: 98 % | TEMPERATURE: 98.2 F | BODY MASS INDEX: 21.53 KG/M2 | SYSTOLIC BLOOD PRESSURE: 115 MMHG | WEIGHT: 117 LBS | BODY MASS INDEX: 21.53 KG/M2 | HEART RATE: 77 BPM | TEMPERATURE: 98.2 F | HEART RATE: 77 BPM | HEIGHT: 62 IN | DIASTOLIC BLOOD PRESSURE: 70 MMHG | DIASTOLIC BLOOD PRESSURE: 70 MMHG | HEIGHT: 62 IN | RESPIRATION RATE: 16 BRPM | SYSTOLIC BLOOD PRESSURE: 115 MMHG | RESPIRATION RATE: 16 BRPM | WEIGHT: 117 LBS

## 2023-09-02 DIAGNOSIS — S39.012A STRAIN OF LUMBAR REGION, INITIAL ENCOUNTER: Primary | ICD-10-CM

## 2023-09-02 PROCEDURE — 6360000002 HC RX W HCPCS: Performed by: NURSE PRACTITIONER

## 2023-09-02 PROCEDURE — 96372 THER/PROPH/DIAG INJ SC/IM: CPT

## 2023-09-02 PROCEDURE — 6370000000 HC RX 637 (ALT 250 FOR IP): Performed by: NURSE PRACTITIONER

## 2023-09-02 PROCEDURE — 99284 EMERGENCY DEPT VISIT MOD MDM: CPT

## 2023-09-02 RX ORDER — METHOCARBAMOL 500 MG/1
750 TABLET, FILM COATED ORAL ONCE
Status: COMPLETED | OUTPATIENT
Start: 2023-09-02 | End: 2023-09-02

## 2023-09-02 RX ORDER — PREDNISONE 20 MG/1
20 TABLET ORAL 2 TIMES DAILY
Qty: 10 TABLET | Refills: 0 | Status: SHIPPED | OUTPATIENT
Start: 2023-09-02 | End: 2023-09-07

## 2023-09-02 RX ORDER — KETOROLAC TROMETHAMINE 15 MG/ML
30 INJECTION, SOLUTION INTRAMUSCULAR; INTRAVENOUS ONCE
Status: COMPLETED | OUTPATIENT
Start: 2023-09-02 | End: 2023-09-02

## 2023-09-02 RX ORDER — METHOCARBAMOL 750 MG/1
750 TABLET, FILM COATED ORAL 4 TIMES DAILY
Qty: 20 TABLET | Refills: 0 | Status: SHIPPED | OUTPATIENT
Start: 2023-09-02 | End: 2023-09-07

## 2023-09-02 RX ADMIN — KETOROLAC TROMETHAMINE 30 MG: 15 INJECTION, SOLUTION INTRAMUSCULAR; INTRAVENOUS at 12:44

## 2023-09-02 RX ADMIN — METHOCARBAMOL 750 MG: 500 TABLET ORAL at 12:43

## 2023-09-02 ASSESSMENT — PAIN SCALES - GENERAL: PAINLEVEL_OUTOF10: 10

## 2023-09-02 ASSESSMENT — PAIN - FUNCTIONAL ASSESSMENT: PAIN_FUNCTIONAL_ASSESSMENT: 0-10

## 2023-09-02 NOTE — DISCHARGE INSTRUCTIONS
Please go to [employer] or call [phone] to find a new provider.      Or use QR code below:

## 2023-09-02 NOTE — CARE COORDINATION
CM received consult from [facility] CNP for patient in room #25 to assist with outpatient resources. GUILLAUME Fletcher RN met with patient to begin discharge planning. CM met with patient and patient nephew in room to determine CM needs. Patient reports she is interested in mental health resources. CM informed patient of ability to be seen at [facility] during walk in hours. CM also suggested [facility]. Patient reports she is familiar with [facility] in [facility] and has been a patient there in the past. The current difficulty is patient has recently moved from [facility] to [place]. Patient expressed interested in [facility]. CM provided list of local [address] [place] [address] resources.

## 2023-10-08 ENCOUNTER — APPOINTMENT (OUTPATIENT)
Dept: GENERAL RADIOLOGY | Age: 28
End: 2023-10-08
Payer: COMMERCIAL

## 2023-10-08 ENCOUNTER — HOSPITAL ENCOUNTER (EMERGENCY)
Age: 28
Discharge: HOME | End: 2023-10-08
Payer: COMMERCIAL

## 2023-10-08 VITALS
OXYGEN SATURATION: 97 % | RESPIRATION RATE: 15 BRPM | TEMPERATURE: 98.6 F | BODY MASS INDEX: 21.53 KG/M2 | DIASTOLIC BLOOD PRESSURE: 61 MMHG | HEIGHT: 62 IN | HEART RATE: 62 BPM | WEIGHT: 117 LBS | SYSTOLIC BLOOD PRESSURE: 127 MMHG

## 2023-10-08 DIAGNOSIS — S69.92XA FINGER INJURY, LEFT, INITIAL ENCOUNTER: Primary | ICD-10-CM

## 2023-10-08 PROCEDURE — 73140 X-RAY EXAM OF FINGER(S): CPT

## 2023-10-08 PROCEDURE — 99283 EMERGENCY DEPT VISIT LOW MDM: CPT

## 2023-10-08 PROCEDURE — 6370000000 HC RX 637 (ALT 250 FOR IP): Performed by: PHYSICIAN ASSISTANT

## 2023-10-08 RX ORDER — IBUPROFEN 600 MG/1
600 TABLET ORAL ONCE
Status: CANCELLED | OUTPATIENT
Start: 2023-10-08 | End: 2023-10-08

## 2023-10-08 RX ORDER — HYDROCODONE BITARTRATE AND ACETAMINOPHEN 5; 325 MG/1; MG/1
1 TABLET ORAL ONCE
Status: COMPLETED | OUTPATIENT
Start: 2023-10-08 | End: 2023-10-08

## 2023-10-08 RX ORDER — ACETAMINOPHEN 500 MG
1000 TABLET ORAL EVERY 6 HOURS PRN
Qty: 120 TABLET | Refills: 0 | Status: SHIPPED | OUTPATIENT
Start: 2023-10-08

## 2023-10-08 RX ORDER — ACETAMINOPHEN 325 MG/1
650 TABLET ORAL EVERY 6 HOURS PRN
Qty: 40 TABLET | Refills: 0 | Status: SHIPPED | OUTPATIENT
Start: 2023-10-08

## 2023-10-08 RX ORDER — IBUPROFEN 600 MG/1
600 TABLET ORAL ONCE
Status: COMPLETED | OUTPATIENT
Start: 2023-10-08 | End: 2023-10-08

## 2023-10-08 RX ORDER — IBUPROFEN 600 MG/1
600 TABLET ORAL EVERY 6 HOURS PRN
Qty: 20 TABLET | Refills: 0 | Status: SHIPPED | OUTPATIENT
Start: 2023-10-08 | End: 2023-11-07

## 2023-10-08 RX ADMIN — IBUPROFEN 600 MG: 600 TABLET, FILM COATED ORAL at 18:20

## 2023-10-08 RX ADMIN — HYDROCODONE BITARTRATE AND ACETAMINOPHEN 1 TABLET: 5; 325 TABLET ORAL at 18:19

## 2023-10-08 ASSESSMENT — PAIN DESCRIPTION - ORIENTATION
ORIENTATION: LEFT
ORIENTATION: LEFT;MID
ORIENTATION: LEFT

## 2023-10-08 ASSESSMENT — PAIN DESCRIPTION - LOCATION
LOCATION: FINGER (COMMENT WHICH ONE)
LOCATION: HAND
LOCATION: HAND

## 2023-10-08 ASSESSMENT — PAIN SCALES - GENERAL
PAINLEVEL_OUTOF10: 10
PAINLEVEL_OUTOF10: 8
PAINLEVEL_OUTOF10: 10

## 2023-10-08 ASSESSMENT — PAIN DESCRIPTION - DESCRIPTORS
DESCRIPTORS: BURNING;THROBBING
DESCRIPTORS: THROBBING;BURNING

## 2023-10-08 NOTE — ED PROVIDER NOTES
TempSrc: Oral   SpO2: 97%   Weight: 117 lb (53.1 kg)   Height: 5' 2\" (1.575 m)       Patient was given thefollowing medications:  Medications   ibuprofen (ADVIL;MOTRIN) tablet 600 mg (has no administration in time range)   HYDROcodone-acetaminophen (NORCO) 5-325 MG per tablet 1 tablet (has no administration in time range)         Is this patient to be included in the SEP-1 Core Measure due to severe sepsis or septic shock? No   Exclusion criteria - the patient is NOT to be included for SEP-1 Core Measure due to:  2+ SIRS criteria are not met    MDM:    CC/HPI Summary, DDx, ED Course, and Reassessment:     Patient presents as above. Emergent etiologies considered. Patient seen and examined. Work-up initiated secondary to presentation, physical exam findings, vital signs and medical chart review. In brief, 65-year-old female presenting the emergency department today with left finger injury, states that she got her finger caught in a cart and was trying to stop it when someone was trying to steal items at her place of employment. She is having pain to the base of the palmar aspect of the left finger. Has pain with passive extension. No obvious bruising or swelling open sores or wounds. No obvious signs of tendon injury but that is a concern. X-ray demonstrated no signs of underlying fracture dislocation or osseous abnormality. Will attempt to place into a finger splint with rey tape. Will provide NSAIDs and pain control. Will discharge with NSAIDs, will need follow-up with orthopedics if symptoms continue. Will discharge home in stable condition.     History from : Patient    Limitations to history : None    Patient was given the following medications:  Medications   ibuprofen (ADVIL;MOTRIN) tablet 600 mg (has no administration in time range)   HYDROcodone-acetaminophen (NORCO) 5-325 MG per tablet 1 tablet (has no administration in time range)       Independent Imaging Interpretation by me: Left

## 2023-10-08 NOTE — ED NOTES
Patient to ED after getting finger stuck in a shopping cart at work trying to stop a customer from leaving without paying for items. Does not want to file workers comp for incident. States would prefer any prescriptions be printed as she is not familiar with the area and is unsure of the closest pharmacy to home. Took tylenol prior to arrival to ED.       Linda Olmos RN  10/08/23 9448

## 2023-10-08 NOTE — ACP (ADVANCE CARE PLANNING)
Patient does not have any ACP documents/Medical Power of . LSW notes hospital will follow [place] Next of Kin hierarchy in the following descending order for priority:    Guardian  Spouse  Majority of adult Children  Parents  Majority of adult Siblings  Nearest Relative not described above    Per [place] Next of Kin hierarchy: Patients' parents will be [address]

## 2023-11-02 ENCOUNTER — HOSPITAL ENCOUNTER (EMERGENCY)
Age: 28
Discharge: HOME | End: 2023-11-02
Payer: COMMERCIAL

## 2023-11-02 VITALS
BODY MASS INDEX: 21.37 KG/M2 | WEIGHT: 116.84 LBS | DIASTOLIC BLOOD PRESSURE: 70 MMHG | RESPIRATION RATE: 18 BRPM | SYSTOLIC BLOOD PRESSURE: 110 MMHG | TEMPERATURE: 98 F | HEART RATE: 76 BPM | OXYGEN SATURATION: 98 %

## 2023-11-02 DIAGNOSIS — S20.462A INSECT BITE OF LEFT BACK WALL OF THORAX, INITIAL ENCOUNTER: ICD-10-CM
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
## 2023-11-02 DIAGNOSIS — J02.9 ACUTE PHARYNGITIS, UNSPECIFIED ETIOLOGY: Primary | ICD-10-CM

## 2023-11-02 PROCEDURE — 87430 STREP A AG IA: CPT

## 2023-11-02 PROCEDURE — 99283 EMERGENCY DEPT VISIT LOW MDM: CPT

## 2023-11-02 PROCEDURE — 87081 CULTURE SCREEN ONLY: CPT

## 2023-11-02 RX ORDER — ACETAMINOPHEN 500 MG
1000 TABLET ORAL ONCE
Status: DISCONTINUED | OUTPATIENT
Start: 2023-11-02 | End: 2023-11-02 | Stop reason: HOSPADM

## 2023-11-02 NOTE — ED PROVIDER NOTES
EMERGENCY DEPARTMENT ENCOUNTER        Pt Name: Joshua Valencia  MRN: [id number]  [address] [facility] [place] 1995  Date of evaluation: 11/2/2023  Provider: Ronnie Camacho  PCP: No primary care provider on file. GUY. I have evaluated this patient. Triage CHIEF COMPLAINT       Chief Complaint   Patient presents with    Pharyngitis    Insect Bite     Upper side of back         HISTORY OF PRESENT ILLNESS      Chief Complaint: Sore throat, Spider bite    Joshua Valencia is a 24 y.o. female who presents for a sore throat and spider bite. Sore throat began yesterday. Constant. Worse with swallowing. Associated nasal congestion, cough, hoarse voice, and swelling to the submandibular area. Denies fever or chills. Spider bite was also noted yesterday. Localized to the left side of the back. She states she cannot see it but it feels tender and she believes it is a spider bite because her nephew recently had 2 spider bites. Nursing Notes were all reviewed and agreed with or any disagreements were addressed in the HPI. REVIEW OF SYSTEMS     CONSTITUTIONAL:  Denies fever. EYES:  Denies visual changes. HEAD:  Denies headache. ENT:  Denies earache, nasal congestion, + sore throat. NECK:  Denies neck pain. RESPIRATORY:  Denies any shortness of breath. CARDIOVASCULAR:  Denies chest pain. GI:  Denies nausea or vomiting. :  Denies urinary symptoms. MUSCULOSKELETAL:  Denies extremity pain or swelling. BACK:  Denies back pain. INTEGUMENT:  + spider bite    PAST MEDICAL HISTORY     Past Medical History:   Diagnosis Date    ADHD (attention deficit hyperactivity disorder)     Mild preeclampsia delivered 1/10/2017       SURGICAL HISTORY   History reviewed. No pertinent surgical history.      [facility]       Discharge Medication List as of 11/2/2023  2:03 AM        CONTINUE these medications which have NOT CHANGED    Details   ibuprofen (IBU) 600 MG tablet Take 1 tablet by mouth every 6

## 2023-11-04 LAB
CULTURE: NORMAL
Lab: NORMAL
SPECIMEN: NORMAL
STREP A DIRECT SCREEN: NEGATIVE

## 2025-06-16 ENCOUNTER — APPOINTMENT (OUTPATIENT)
Dept: GENERAL RADIOLOGY | Age: 30
End: 2025-06-16
Payer: COMMERCIAL

## 2025-06-16 ENCOUNTER — HOSPITAL ENCOUNTER (EMERGENCY)
Age: 30
Discharge: HOME | End: 2025-06-16
Attending: EMERGENCY MEDICINE
Payer: COMMERCIAL

## 2025-06-16 VITALS
HEART RATE: 77 BPM | WEIGHT: 128 LBS | SYSTOLIC BLOOD PRESSURE: 104 MMHG | TEMPERATURE: 98 F | RESPIRATION RATE: 16 BRPM | BODY MASS INDEX: 23.55 KG/M2 | OXYGEN SATURATION: 97 % | DIASTOLIC BLOOD PRESSURE: 66 MMHG | HEIGHT: 62 IN

## 2025-06-16 DIAGNOSIS — S93.402A SPRAIN OF LEFT ANKLE, UNSPECIFIED LIGAMENT, INITIAL ENCOUNTER: Primary | ICD-10-CM

## 2025-06-16 PROCEDURE — 99283 EMERGENCY DEPT VISIT LOW MDM: CPT

## 2025-06-16 PROCEDURE — 6370000000 HC RX 637 (ALT 250 FOR IP): Performed by: EMERGENCY MEDICINE

## 2025-06-16 PROCEDURE — 73610 X-RAY EXAM OF ANKLE: CPT

## 2025-06-16 RX ORDER — MELOXICAM 7.5 MG/1
7.5 TABLET ORAL ONCE
Status: COMPLETED | OUTPATIENT
Start: 2025-06-16 | End: 2025-06-16

## 2025-06-16 RX ORDER — MELOXICAM 15 MG/1
15 TABLET ORAL DAILY PRN
Qty: 30 TABLET | Refills: 0 | Status: SHIPPED | OUTPATIENT
Start: 2025-06-16 | End: 2025-06-20

## 2025-06-16 RX ORDER — HYDROCODONE BITARTRATE AND ACETAMINOPHEN 5; 325 MG/1; MG/1
1 TABLET ORAL ONCE
Status: COMPLETED | OUTPATIENT
Start: 2025-06-16 | End: 2025-06-16

## 2025-06-16 RX ADMIN — HYDROCODONE BITARTRATE AND ACETAMINOPHEN 1 TABLET: 5; 325 TABLET ORAL at 02:03

## 2025-06-16 RX ADMIN — MELOXICAM 7.5 MG: 7.5 TABLET ORAL at 02:03

## 2025-06-16 ASSESSMENT — PAIN DESCRIPTION - LOCATION
LOCATION: ANKLE
LOCATION: ANKLE

## 2025-06-16 ASSESSMENT — PAIN - FUNCTIONAL ASSESSMENT
PAIN_FUNCTIONAL_ASSESSMENT: 0-10
PAIN_FUNCTIONAL_ASSESSMENT: 0-10

## 2025-06-16 ASSESSMENT — PAIN DESCRIPTION - ORIENTATION
ORIENTATION: LEFT
ORIENTATION: LEFT

## 2025-06-16 ASSESSMENT — PAIN DESCRIPTION - DESCRIPTORS
DESCRIPTORS: ACHING
DESCRIPTORS: ACHING

## 2025-06-16 ASSESSMENT — PAIN SCALES - GENERAL
PAINLEVEL_OUTOF10: 10
PAINLEVEL_OUTOF10: 5

## 2025-06-16 NOTE — ED PROVIDER NOTES
[facility] EMERGENCY DEPARTMENT  EMERGENCY DEPARTMENT ENCOUNTER      Pt Name: Walker Pina  MRN: [id number]  Birthdate 1995  Date of evaluation: 6/16/2025  Provider: Carolina Okeefe MD    CHIEF COMPLAINT       Chief Complaint   Patient presents with    Ankle Pain     Stepped off a step wrong         HISTORY OF PRESENT ILLNESS      Walker Pina is a 29 y.o. female who presents to the emergency department  for   Chief Complaint   Patient presents with    Ankle Pain     Stepped off a step wrong       29-year-old female presents with left ankle pain.  She was at a local library reports she stepped down and twisted her ankle.  She \"felt a pop.\"  She is not complaining of pain with ambulation.  No numbness or tingling in leg.  No other injuries.  She is not anticoagulated.  She presents grossly moving all extremities.  She presents by EMS          Nursing Notes, Triage Notes & Vital Signs were reviewed.      REVIEW OF SYSTEMS    (2-9 systems for level 4, 10 or more for level 5)     Review of Systems   Musculoskeletal:         Left ankle pain       Except as noted above the remainder of the review of systems was reviewed and negative.       PAST MEDICAL HISTORY     Past Medical History:   Diagnosis Date    ADHD (attention deficit hyperactivity disorder)     Mild preeclampsia delivered 1/10/2017       Prior to Admission medications    Medication Sig Start Date End Date Taking? Authorizing Provider   meloxicam (MOBIC) 15 MG tablet Take 1 tablet by mouth daily as needed for Pain 6/16/25  Yes Carolina Curtis MD   ibuprofen (IBU) 600 MG tablet Take 1 tablet by mouth every 6 hours as needed for Pain 10/8/23 11/7/23  Juan Rivera PA   acetaminophen (AMINOFEN) 325 MG tablet Take 2 tablets by mouth every 6 hours as needed for Pain 10/8/23   Juan Rivera PA   acetaminophen (TYLENOL) 500 MG tablet Take 2 tablets by mouth every 6 hours as needed for Pain 10/8/23   Miguel

## 2025-06-16 NOTE — DISCHARGE INSTRUCTIONS
Your xray today did not show any fracture in your ankle.     You may find that heat/ice and anti-inflammatory medications like ibuprofen, naproxen or tylenol will help with your sympotms.    If you develop any worsening or concerning symptoms, please seek immediate medical attention.

## 2025-06-20 ENCOUNTER — APPOINTMENT (OUTPATIENT)
Dept: GENERAL RADIOLOGY | Age: 30
End: 2025-06-20
Payer: COMMERCIAL

## 2025-06-20 ENCOUNTER — HOSPITAL ENCOUNTER (EMERGENCY)
Age: 30
Discharge: HOME | End: 2025-06-20
Payer: COMMERCIAL

## 2025-06-20 VITALS
TEMPERATURE: 98.2 F | OXYGEN SATURATION: 97 % | RESPIRATION RATE: 18 BRPM | SYSTOLIC BLOOD PRESSURE: 117 MMHG | DIASTOLIC BLOOD PRESSURE: 65 MMHG | HEART RATE: 61 BPM

## 2025-06-20 DIAGNOSIS — M79.672 LEFT FOOT PAIN: ICD-10-CM

## 2025-06-20 DIAGNOSIS — M25.572 ACUTE LEFT ANKLE PAIN: Primary | ICD-10-CM

## 2025-06-20 PROCEDURE — 73610 X-RAY EXAM OF ANKLE: CPT

## 2025-06-20 PROCEDURE — 99283 EMERGENCY DEPT VISIT LOW MDM: CPT

## 2025-06-20 PROCEDURE — 73630 X-RAY EXAM OF FOOT: CPT

## 2025-06-20 PROCEDURE — 6370000000 HC RX 637 (ALT 250 FOR IP): Performed by: PHYSICIAN ASSISTANT

## 2025-06-20 RX ORDER — IBUPROFEN 600 MG/1
600 TABLET, FILM COATED ORAL EVERY 6 HOURS PRN
Qty: 20 TABLET | Refills: 0 | Status: SHIPPED | OUTPATIENT
Start: 2025-06-20 | End: 2025-06-30

## 2025-06-20 RX ORDER — HYDROCODONE BITARTRATE AND ACETAMINOPHEN 5; 325 MG/1; MG/1
1 TABLET ORAL ONCE
Refills: 0 | Status: COMPLETED | OUTPATIENT
Start: 2025-06-20 | End: 2025-06-20

## 2025-06-20 RX ADMIN — HYDROCODONE BITARTRATE AND ACETAMINOPHEN 1 TABLET: 5; 325 TABLET ORAL at 17:58

## 2025-06-20 ASSESSMENT — PAIN DESCRIPTION - LOCATION
LOCATION: ANKLE

## 2025-06-20 ASSESSMENT — PAIN SCALES - GENERAL
PAINLEVEL_OUTOF10: 8
PAINLEVEL_OUTOF10: 7
PAINLEVEL_OUTOF10: 9

## 2025-06-20 ASSESSMENT — PAIN DESCRIPTION - DESCRIPTORS
DESCRIPTORS: ACHING
DESCRIPTORS: ACHING;SHARP
DESCRIPTORS: ACHING

## 2025-06-20 ASSESSMENT — PAIN - FUNCTIONAL ASSESSMENT
PAIN_FUNCTIONAL_ASSESSMENT: 0-10
PAIN_FUNCTIONAL_ASSESSMENT: 0-10

## 2025-06-20 ASSESSMENT — PAIN DESCRIPTION - ORIENTATION
ORIENTATION: LEFT
ORIENTATION: LEFT

## 2025-06-20 ASSESSMENT — LIFESTYLE VARIABLES
HOW OFTEN DO YOU HAVE A DRINK CONTAINING ALCOHOL: NEVER
HOW MANY STANDARD DRINKS CONTAINING ALCOHOL DO YOU HAVE ON A TYPICAL DAY: PATIENT DOES NOT DRINK

## 2025-06-20 NOTE — ED NOTES
Patient presents to the ED today with c/o left ankle pain. Patient states that they were seen a couple days ago and the pain has worsened. States that she was stepping off of a step and rolled it feeling 2 pops.

## 2025-06-20 NOTE — DISCHARGE INSTRUCTIONS
Please contact the orthopedic provider today or tomorrow to schedule an appointment within the next   7-10 days (unless specified sooner) for reevaluation and further treatment.  Alternatively or in addition to, please contact your primary care provider to discuss your visit to the emergency department, and schedule an appointment for reevaluation if needed referral to outpatient provider.  Meanwhile, you can continue to ice, elevate to help with pain, inflammation, and swelling.  If you were provided an ACE wrap or splint you can continue to use those.  Ok to walk, but limit walking if this worsens your pain.  For pain relief you can use the prescription meloxicam. Alternatively, use  over the counter medications such as 400-600 ibuprofen (e.g. Advil, Motrin) .  If you need additional pain relief use  650-1000 mg every 6 hours acetaminophen (e.g. Tylenol) at the same time.  Do not use these if you have any allergies or contraindications to these medications.   Return to emergency department if you develop any worsening uncontrollable pain; numbness or weakness; if your injury becomes pale, cold, or blue; worsening symptoms or any new concerns.

## 2025-06-20 NOTE — ED PROVIDER NOTES
radiologist. Plain radiographic images are visualized and preliminarily interpreted by the ED Provider with findings mentioned in ED Course and MDM    Interpretation per the Radiologist below, if available at the time of this note:    XR FOOT LEFT (MIN 3 VIEWS)   Final Result      XR ANKLE LEFT (MIN 3 VIEWS)   Final Result        XR FOOT LEFT (MIN 3 VIEWS)  Result Date: 6/20/2025  XR FOOT LEFT (MIN 3 VIEWS), 6/20/2025 17:24 HISTORY/REASON FOR EXAM: Pain. Pain FINDINGS: AP, lateral and oblique views of the left foot reveal the bones, joints and adjacent soft tissues of the left foot to be normal in appearance. No fracture, dislocation, soft tissue swelling or soft tissue calcifications are evident.  There is no evidence of a foreign body. IMPRESSION:  Normal 3 view left foot study. Followup examination is suggested if symptoms persist and if clinically deemed necessary. This dictation was created with voice recognition software. While attempts have been made to review the dictation as it is transcribed, on occasion the spoken word can be misinterpreted by the technology leading to omissions or inappropriate words, phrases or sentences.  Dictated and Electronically Signed By: Kirill Naranjo MD [facility] 6/20/2025 17:47        XR ANKLE LEFT (MIN 3 VIEWS)  Result Date: 6/20/2025  XR ANKLE LEFT (MIN 3 VIEWS), 6/20/2025 17:24 29 years, Female HISTORY/REASON FOR EXAM: trauma. Pain. . Findings: AP, lateral, and oblique views of the left ankle demonstrate mild soft  tissue swelling anteriorly. The bones, the adjacent soft tissues and the joints  of the left ankle to be otherwise normal in appearance.  No fracture, dislocation or soft tissue calcifications are evident.  There is no evidence of a foreign body. IMPRESSION: Mild soft tissue swelling. Otherwise Normal 3 view left ankle study. Followup examination as clinically deemed necessary. If there is persistent clinical concern, appropriate additional  contusion, soft tissue injury, sprain strain, or possible occult fracture.  I did discuss differentials with the patient.  Recommendation to continue NSAIDs, and advised for scheduled Tylenol for additional pain relief. RICE  On discussion with her, she states she does tolerate ibuprofen, without issue despite it being on her allergy list.  She feels like meloxicam is not helping, alternatively offered that she could use ibuprofen instead of the meloxicam but not both.  Outpatient resources provided for her to follow-up with orthopedics, as well as return precautions.  Return for any worsening.      Condition is: mild stable acute illness    Discussion with Other Profesionals : None    Disposition Considerations (tests considered but not done, Shared Decision Making, Pt Expectation of Test or Tx.): none (unless indicated in ED Course, Summary, Reassessment, or MDM    Escalation of care, including admission/OBS considered : Appropriate for outpatient management.     Records Reviewed : None    Social Determinants of Health : None     Chronic conditions affecting care:    has a past medical history of ADHD (attention deficit hyperactivity disorder) and Mild preeclampsia delivered (1/10/2017).    I am the Primary Clinician of Record.    Patient was given the following medications:  Medications   HYDROcodone-acetaminophen (NORCO) 5-325 MG per tablet 1 tablet (1 tablet Oral Given 6/20/25 4805)         Is this patient to be included in the SEP-1 Core Measure due to severe sepsis or septic shock?   No   Exclusion criteria - the patient is NOT to be included for SEP-1 Core Measure due to:  2+ SIRS criteria are not met    CRITICAL CARE TIME         PROCEDURES   Unless otherwise noted  none     Procedures      FINAL IMPRESSION      1. Acute left ankle pain    2. Left foot pain          DISPOSITION/PLAN     DISPOSITION Discharge - Pending Orders Complete 06/20/2025 06:16:27 PM   DISPOSITION CONDITION Stable           PATIENT

## 2025-08-09 ENCOUNTER — HOSPITAL ENCOUNTER (EMERGENCY)
Age: 30
Discharge: HOME | End: 2025-08-09
Payer: COMMERCIAL

## 2025-08-09 VITALS
OXYGEN SATURATION: 100 % | RESPIRATION RATE: 16 BRPM | TEMPERATURE: 97.9 F | HEART RATE: 55 BPM | SYSTOLIC BLOOD PRESSURE: 118 MMHG | DIASTOLIC BLOOD PRESSURE: 72 MMHG

## 2025-08-09 DIAGNOSIS — M25.572 LEFT ANKLE PAIN, UNSPECIFIED CHRONICITY: Primary | ICD-10-CM

## 2025-08-09 PROCEDURE — 6370000000 HC RX 637 (ALT 250 FOR IP): Performed by: PHYSICIAN ASSISTANT

## 2025-08-09 PROCEDURE — 99283 EMERGENCY DEPT VISIT LOW MDM: CPT

## 2025-08-09 RX ORDER — HYDROCODONE BITARTRATE AND ACETAMINOPHEN 5; 325 MG/1; MG/1
1 TABLET ORAL EVERY 8 HOURS PRN
Qty: 10 TABLET | Refills: 0 | Status: SHIPPED | OUTPATIENT
Start: 2025-08-09 | End: 2025-08-12

## 2025-08-09 RX ORDER — IBUPROFEN 600 MG/1
600 TABLET, FILM COATED ORAL 3 TIMES DAILY PRN
Qty: 30 TABLET | Refills: 0 | Status: SHIPPED | OUTPATIENT
Start: 2025-08-09 | End: 2025-08-15 | Stop reason: ALTCHOICE

## 2025-08-09 RX ORDER — HYDROCODONE BITARTRATE AND ACETAMINOPHEN 5; 325 MG/1; MG/1
1 TABLET ORAL ONCE
Status: COMPLETED | OUTPATIENT
Start: 2025-08-09 | End: 2025-08-09

## 2025-08-09 RX ADMIN — HYDROCODONE BITARTRATE AND ACETAMINOPHEN 1 TABLET: 5; 325 TABLET ORAL at 17:32

## 2025-08-09 ASSESSMENT — PAIN DESCRIPTION - FREQUENCY: FREQUENCY: CONTINUOUS

## 2025-08-09 ASSESSMENT — PAIN DESCRIPTION - PAIN TYPE: TYPE: ACUTE PAIN

## 2025-08-09 ASSESSMENT — PAIN - FUNCTIONAL ASSESSMENT
PAIN_FUNCTIONAL_ASSESSMENT: 0-10
PAIN_FUNCTIONAL_ASSESSMENT: 0-10

## 2025-08-09 ASSESSMENT — LIFESTYLE VARIABLES: HOW OFTEN DO YOU HAVE A DRINK CONTAINING ALCOHOL: NEVER

## 2025-08-09 ASSESSMENT — PAIN DESCRIPTION - ORIENTATION: ORIENTATION: RIGHT

## 2025-08-09 ASSESSMENT — PAIN DESCRIPTION - LOCATION: LOCATION: FOOT

## 2025-08-09 ASSESSMENT — PAIN DESCRIPTION - DESCRIPTORS: DESCRIPTORS: ACHING;DISCOMFORT

## 2025-08-09 ASSESSMENT — PAIN SCALES - GENERAL: PAINLEVEL_OUTOF10: 10

## 2025-08-15 ENCOUNTER — HOSPITAL ENCOUNTER (EMERGENCY)
Age: 30
Discharge: HOME | End: 2025-08-15
Attending: EMERGENCY MEDICINE
Payer: COMMERCIAL

## 2025-08-15 VITALS
TEMPERATURE: 97.6 F | RESPIRATION RATE: 18 BRPM | HEART RATE: 73 BPM | DIASTOLIC BLOOD PRESSURE: 66 MMHG | OXYGEN SATURATION: 95 % | SYSTOLIC BLOOD PRESSURE: 115 MMHG

## 2025-08-15 DIAGNOSIS — Z76.0 ENCOUNTER FOR MEDICATION REFILL: ICD-10-CM

## 2025-08-15 DIAGNOSIS — M25.572 ACUTE LEFT ANKLE PAIN: Primary | ICD-10-CM

## 2025-08-15 PROCEDURE — 96372 THER/PROPH/DIAG INJ SC/IM: CPT

## 2025-08-15 PROCEDURE — 6360000002 HC RX W HCPCS: Performed by: EMERGENCY MEDICINE

## 2025-08-15 PROCEDURE — 99284 EMERGENCY DEPT VISIT MOD MDM: CPT

## 2025-08-15 RX ORDER — KETOROLAC TROMETHAMINE 15 MG/ML
30 INJECTION, SOLUTION INTRAMUSCULAR; INTRAVENOUS ONCE
Status: COMPLETED | OUTPATIENT
Start: 2025-08-15 | End: 2025-08-15

## 2025-08-15 RX ORDER — ESCITALOPRAM OXALATE 10 MG/1
10 TABLET ORAL DAILY
Qty: 30 TABLET | Refills: 1 | Status: SHIPPED | OUTPATIENT
Start: 2025-08-15

## 2025-08-15 RX ORDER — KETOROLAC TROMETHAMINE 10 MG/1
10 TABLET, FILM COATED ORAL EVERY 6 HOURS PRN
Qty: 18 TABLET | Refills: 0 | Status: SHIPPED | OUTPATIENT
Start: 2025-08-15 | End: 2026-08-15

## 2025-08-15 RX ORDER — HYDROXYZINE HYDROCHLORIDE 25 MG/1
25 TABLET, FILM COATED ORAL EVERY 8 HOURS PRN
Qty: 30 TABLET | Refills: 1 | Status: SHIPPED | OUTPATIENT
Start: 2025-08-15 | End: 2025-09-14

## 2025-08-15 RX ADMIN — KETOROLAC TROMETHAMINE 30 MG: 15 INJECTION, SOLUTION INTRAMUSCULAR; INTRAVENOUS at 16:12
